# Patient Record
Sex: FEMALE | Race: ASIAN | NOT HISPANIC OR LATINO | URBAN - METROPOLITAN AREA
[De-identification: names, ages, dates, MRNs, and addresses within clinical notes are randomized per-mention and may not be internally consistent; named-entity substitution may affect disease eponyms.]

---

## 2018-06-13 ENCOUNTER — OUTPATIENT (OUTPATIENT)
Dept: OUTPATIENT SERVICES | Facility: HOSPITAL | Age: 28
LOS: 1 days | Discharge: HOME | End: 2018-06-13

## 2018-06-15 DIAGNOSIS — H90.3 SENSORINEURAL HEARING LOSS, BILATERAL: ICD-10-CM

## 2018-08-29 PROBLEM — Z00.00 ENCOUNTER FOR PREVENTIVE HEALTH EXAMINATION: Status: ACTIVE | Noted: 2018-08-29

## 2018-09-27 ENCOUNTER — OUTPATIENT (OUTPATIENT)
Dept: OUTPATIENT SERVICES | Facility: HOSPITAL | Age: 28
LOS: 1 days | Discharge: HOME | End: 2018-09-27

## 2018-09-27 DIAGNOSIS — Z00.01 ENCOUNTER FOR GENERAL ADULT MEDICAL EXAMINATION WITH ABNORMAL FINDINGS: ICD-10-CM

## 2018-09-27 DIAGNOSIS — Z11.4 ENCOUNTER FOR SCREENING FOR HUMAN IMMUNODEFICIENCY VIRUS [HIV]: ICD-10-CM

## 2018-09-28 ENCOUNTER — APPOINTMENT (OUTPATIENT)
Dept: OPHTHALMOLOGY | Facility: CLINIC | Age: 28
End: 2018-09-28
Payer: COMMERCIAL

## 2018-09-28 DIAGNOSIS — H47.323 DRUSEN OF OPTIC DISC, BILATERAL: ICD-10-CM

## 2018-09-28 PROCEDURE — 92015 DETERMINE REFRACTIVE STATE: CPT

## 2018-09-28 PROCEDURE — 92004 COMPRE OPH EXAM NEW PT 1/>: CPT

## 2018-09-29 ENCOUNTER — TRANSCRIPTION ENCOUNTER (OUTPATIENT)
Age: 28
End: 2018-09-29

## 2019-11-27 ENCOUNTER — INPATIENT (INPATIENT)
Facility: HOSPITAL | Age: 29
LOS: 4 days | Discharge: HOME | End: 2019-12-02
Attending: SURGERY | Admitting: SURGERY
Payer: COMMERCIAL

## 2019-11-27 ENCOUNTER — RESULT REVIEW (OUTPATIENT)
Age: 29
End: 2019-11-27

## 2019-11-27 VITALS
RESPIRATION RATE: 17 BRPM | DIASTOLIC BLOOD PRESSURE: 82 MMHG | HEIGHT: 60 IN | OXYGEN SATURATION: 100 % | WEIGHT: 117.95 LBS | SYSTOLIC BLOOD PRESSURE: 122 MMHG | HEART RATE: 83 BPM | TEMPERATURE: 98 F

## 2019-11-27 DIAGNOSIS — Z98.890 OTHER SPECIFIED POSTPROCEDURAL STATES: Chronic | ICD-10-CM

## 2019-11-27 LAB
ALBUMIN SERPL ELPH-MCNC: 4.9 G/DL — SIGNIFICANT CHANGE UP (ref 3.5–5.2)
ALP SERPL-CCNC: 75 U/L — SIGNIFICANT CHANGE UP (ref 30–115)
ALT FLD-CCNC: 7 U/L — SIGNIFICANT CHANGE UP (ref 0–41)
ANION GAP SERPL CALC-SCNC: 18 MMOL/L — HIGH (ref 7–14)
APPEARANCE UR: CLEAR — SIGNIFICANT CHANGE UP
AST SERPL-CCNC: 16 U/L — SIGNIFICANT CHANGE UP (ref 0–41)
BASOPHILS # BLD AUTO: 0.02 K/UL — SIGNIFICANT CHANGE UP (ref 0–0.2)
BASOPHILS NFR BLD AUTO: 0.2 % — SIGNIFICANT CHANGE UP (ref 0–1)
BILIRUB SERPL-MCNC: 0.6 MG/DL — SIGNIFICANT CHANGE UP (ref 0.2–1.2)
BILIRUB UR-MCNC: NEGATIVE — SIGNIFICANT CHANGE UP
BLD GP AB SCN SERPL QL: SIGNIFICANT CHANGE UP
BUN SERPL-MCNC: 13 MG/DL — SIGNIFICANT CHANGE UP (ref 10–20)
CALCIUM SERPL-MCNC: 9.5 MG/DL — SIGNIFICANT CHANGE UP (ref 8.5–10.1)
CHLORIDE SERPL-SCNC: 100 MMOL/L — SIGNIFICANT CHANGE UP (ref 98–110)
CO2 SERPL-SCNC: 20 MMOL/L — SIGNIFICANT CHANGE UP (ref 17–32)
COLOR SPEC: YELLOW — SIGNIFICANT CHANGE UP
CREAT SERPL-MCNC: 0.7 MG/DL — SIGNIFICANT CHANGE UP (ref 0.7–1.5)
DIFF PNL FLD: NEGATIVE — SIGNIFICANT CHANGE UP
EOSINOPHIL # BLD AUTO: 0.07 K/UL — SIGNIFICANT CHANGE UP (ref 0–0.7)
EOSINOPHIL NFR BLD AUTO: 0.6 % — SIGNIFICANT CHANGE UP (ref 0–8)
GLUCOSE SERPL-MCNC: 78 MG/DL — SIGNIFICANT CHANGE UP (ref 70–99)
GLUCOSE UR QL: NEGATIVE — SIGNIFICANT CHANGE UP
HCT VFR BLD CALC: 42.7 % — SIGNIFICANT CHANGE UP (ref 37–47)
HGB BLD-MCNC: 14.2 G/DL — SIGNIFICANT CHANGE UP (ref 12–16)
IMM GRANULOCYTES NFR BLD AUTO: 0.3 % — SIGNIFICANT CHANGE UP (ref 0.1–0.3)
KETONES UR-MCNC: ABNORMAL
LACTATE SERPL-SCNC: 0.6 MMOL/L — LOW (ref 0.7–2)
LEUKOCYTE ESTERASE UR-ACNC: NEGATIVE — SIGNIFICANT CHANGE UP
LIDOCAIN IGE QN: 37 U/L — SIGNIFICANT CHANGE UP (ref 7–60)
LYMPHOCYTES # BLD AUTO: 2.89 K/UL — SIGNIFICANT CHANGE UP (ref 1.2–3.4)
LYMPHOCYTES # BLD AUTO: 24.2 % — SIGNIFICANT CHANGE UP (ref 20.5–51.1)
MCHC RBC-ENTMCNC: 28.1 PG — SIGNIFICANT CHANGE UP (ref 27–31)
MCHC RBC-ENTMCNC: 33.3 G/DL — SIGNIFICANT CHANGE UP (ref 32–37)
MCV RBC AUTO: 84.6 FL — SIGNIFICANT CHANGE UP (ref 81–99)
MONOCYTES # BLD AUTO: 0.7 K/UL — HIGH (ref 0.1–0.6)
MONOCYTES NFR BLD AUTO: 5.9 % — SIGNIFICANT CHANGE UP (ref 1.7–9.3)
NEUTROPHILS # BLD AUTO: 8.24 K/UL — HIGH (ref 1.4–6.5)
NEUTROPHILS NFR BLD AUTO: 68.8 % — SIGNIFICANT CHANGE UP (ref 42.2–75.2)
NITRITE UR-MCNC: NEGATIVE — SIGNIFICANT CHANGE UP
NRBC # BLD: 0 /100 WBCS — SIGNIFICANT CHANGE UP (ref 0–0)
PH UR: 6 — SIGNIFICANT CHANGE UP (ref 5–8)
PLATELET # BLD AUTO: 259 K/UL — SIGNIFICANT CHANGE UP (ref 130–400)
POTASSIUM SERPL-MCNC: 4.2 MMOL/L — SIGNIFICANT CHANGE UP (ref 3.5–5)
POTASSIUM SERPL-SCNC: 4.2 MMOL/L — SIGNIFICANT CHANGE UP (ref 3.5–5)
PROT SERPL-MCNC: 7.8 G/DL — SIGNIFICANT CHANGE UP (ref 6–8)
PROT UR-MCNC: SIGNIFICANT CHANGE UP
RBC # BLD: 5.05 M/UL — SIGNIFICANT CHANGE UP (ref 4.2–5.4)
RBC # FLD: 12.3 % — SIGNIFICANT CHANGE UP (ref 11.5–14.5)
SODIUM SERPL-SCNC: 138 MMOL/L — SIGNIFICANT CHANGE UP (ref 135–146)
SP GR SPEC: 1.03 — HIGH (ref 1.01–1.02)
UROBILINOGEN FLD QL: SIGNIFICANT CHANGE UP
WBC # BLD: 11.95 K/UL — HIGH (ref 4.8–10.8)
WBC # FLD AUTO: 11.95 K/UL — HIGH (ref 4.8–10.8)

## 2019-11-27 PROCEDURE — 74177 CT ABD & PELVIS W/CONTRAST: CPT | Mod: 26

## 2019-11-27 PROCEDURE — 99285 EMERGENCY DEPT VISIT HI MDM: CPT

## 2019-11-27 PROCEDURE — 44970 LAPAROSCOPY APPENDECTOMY: CPT

## 2019-11-27 PROCEDURE — 64488 TAP BLOCK BI INJECTION: CPT | Mod: 47

## 2019-11-27 PROCEDURE — 88304 TISSUE EXAM BY PATHOLOGIST: CPT | Mod: 26

## 2019-11-27 PROCEDURE — 76830 TRANSVAGINAL US NON-OB: CPT | Mod: 26

## 2019-11-27 RX ORDER — ONDANSETRON 8 MG/1
4 TABLET, FILM COATED ORAL EVERY 6 HOURS
Refills: 0 | Status: DISCONTINUED | OUTPATIENT
Start: 2019-11-27 | End: 2019-11-27

## 2019-11-27 RX ORDER — SODIUM CHLORIDE 9 MG/ML
1000 INJECTION INTRAMUSCULAR; INTRAVENOUS; SUBCUTANEOUS ONCE
Refills: 0 | Status: COMPLETED | OUTPATIENT
Start: 2019-11-27 | End: 2019-11-27

## 2019-11-27 RX ORDER — CIPROFLOXACIN LACTATE 400MG/40ML
400 VIAL (ML) INTRAVENOUS EVERY 12 HOURS
Refills: 0 | Status: DISCONTINUED | OUTPATIENT
Start: 2019-11-27 | End: 2019-11-30

## 2019-11-27 RX ORDER — MORPHINE SULFATE 50 MG/1
4 CAPSULE, EXTENDED RELEASE ORAL
Refills: 0 | Status: DISCONTINUED | OUTPATIENT
Start: 2019-11-27 | End: 2019-11-28

## 2019-11-27 RX ORDER — HEPARIN SODIUM 5000 [USP'U]/ML
5000 INJECTION INTRAVENOUS; SUBCUTANEOUS EVERY 8 HOURS
Refills: 0 | Status: DISCONTINUED | OUTPATIENT
Start: 2019-11-27 | End: 2019-11-27

## 2019-11-27 RX ORDER — PANTOPRAZOLE SODIUM 20 MG/1
40 TABLET, DELAYED RELEASE ORAL DAILY
Refills: 0 | Status: DISCONTINUED | OUTPATIENT
Start: 2019-11-27 | End: 2019-12-02

## 2019-11-27 RX ORDER — ACETAMINOPHEN 500 MG
650 TABLET ORAL EVERY 6 HOURS
Refills: 0 | Status: DISCONTINUED | OUTPATIENT
Start: 2019-11-27 | End: 2019-11-29

## 2019-11-27 RX ORDER — ONDANSETRON 8 MG/1
4 TABLET, FILM COATED ORAL ONCE
Refills: 0 | Status: COMPLETED | OUTPATIENT
Start: 2019-11-27 | End: 2019-11-27

## 2019-11-27 RX ORDER — SODIUM CHLORIDE 9 MG/ML
1000 INJECTION, SOLUTION INTRAVENOUS
Refills: 0 | Status: DISCONTINUED | OUTPATIENT
Start: 2019-11-27 | End: 2019-11-27

## 2019-11-27 RX ORDER — MORPHINE SULFATE 50 MG/1
4 CAPSULE, EXTENDED RELEASE ORAL ONCE
Refills: 0 | Status: DISCONTINUED | OUTPATIENT
Start: 2019-11-27 | End: 2019-11-27

## 2019-11-27 RX ORDER — MEPERIDINE HYDROCHLORIDE 50 MG/ML
12.5 INJECTION INTRAMUSCULAR; INTRAVENOUS; SUBCUTANEOUS
Refills: 0 | Status: DISCONTINUED | OUTPATIENT
Start: 2019-11-27 | End: 2019-11-28

## 2019-11-27 RX ORDER — IOHEXOL 300 MG/ML
30 INJECTION, SOLUTION INTRAVENOUS ONCE
Refills: 0 | Status: COMPLETED | OUTPATIENT
Start: 2019-11-27 | End: 2019-11-27

## 2019-11-27 RX ORDER — ONDANSETRON 8 MG/1
4 TABLET, FILM COATED ORAL EVERY 6 HOURS
Refills: 0 | Status: DISCONTINUED | OUTPATIENT
Start: 2019-11-27 | End: 2019-12-02

## 2019-11-27 RX ORDER — HEPARIN SODIUM 5000 [USP'U]/ML
5000 INJECTION INTRAVENOUS; SUBCUTANEOUS EVERY 8 HOURS
Refills: 0 | Status: DISCONTINUED | OUTPATIENT
Start: 2019-11-27 | End: 2019-12-02

## 2019-11-27 RX ORDER — CEFOTETAN DISODIUM 1 G
1 VIAL (EA) INJECTION EVERY 12 HOURS
Refills: 0 | Status: DISCONTINUED | OUTPATIENT
Start: 2019-11-27 | End: 2019-11-27

## 2019-11-27 RX ORDER — SODIUM CHLORIDE 9 MG/ML
1000 INJECTION, SOLUTION INTRAVENOUS
Refills: 0 | Status: DISCONTINUED | OUTPATIENT
Start: 2019-11-27 | End: 2019-11-28

## 2019-11-27 RX ORDER — ONDANSETRON 8 MG/1
4 TABLET, FILM COATED ORAL ONCE
Refills: 0 | Status: DISCONTINUED | OUTPATIENT
Start: 2019-11-27 | End: 2019-11-28

## 2019-11-27 RX ORDER — METRONIDAZOLE 500 MG
500 TABLET ORAL EVERY 8 HOURS
Refills: 0 | Status: DISCONTINUED | OUTPATIENT
Start: 2019-11-27 | End: 2019-11-30

## 2019-11-27 RX ORDER — PANTOPRAZOLE SODIUM 20 MG/1
40 TABLET, DELAYED RELEASE ORAL DAILY
Refills: 0 | Status: DISCONTINUED | OUTPATIENT
Start: 2019-11-27 | End: 2019-11-27

## 2019-11-27 RX ORDER — ACETAMINOPHEN 500 MG
1000 TABLET ORAL ONCE
Refills: 0 | Status: DISCONTINUED | OUTPATIENT
Start: 2019-11-27 | End: 2019-11-28

## 2019-11-27 RX ORDER — MORPHINE SULFATE 50 MG/1
2 CAPSULE, EXTENDED RELEASE ORAL EVERY 4 HOURS
Refills: 0 | Status: DISCONTINUED | OUTPATIENT
Start: 2019-11-27 | End: 2019-11-27

## 2019-11-27 RX ORDER — CEFOTETAN DISODIUM 1 G
1 VIAL (EA) INJECTION ONCE
Refills: 0 | Status: COMPLETED | OUTPATIENT
Start: 2019-11-27 | End: 2019-11-27

## 2019-11-27 RX ORDER — MORPHINE SULFATE 50 MG/1
2 CAPSULE, EXTENDED RELEASE ORAL ONCE
Refills: 0 | Status: DISCONTINUED | OUTPATIENT
Start: 2019-11-27 | End: 2019-11-27

## 2019-11-27 RX ORDER — CHLORHEXIDINE GLUCONATE 213 G/1000ML
1 SOLUTION TOPICAL
Refills: 0 | Status: COMPLETED | OUTPATIENT
Start: 2019-11-27 | End: 2019-12-01

## 2019-11-27 RX ORDER — MORPHINE SULFATE 50 MG/1
2 CAPSULE, EXTENDED RELEASE ORAL
Refills: 0 | Status: DISCONTINUED | OUTPATIENT
Start: 2019-11-27 | End: 2019-11-28

## 2019-11-27 RX ORDER — ACETAMINOPHEN 500 MG
650 TABLET ORAL EVERY 6 HOURS
Refills: 0 | Status: DISCONTINUED | OUTPATIENT
Start: 2019-11-27 | End: 2019-11-27

## 2019-11-27 RX ADMIN — ONDANSETRON 4 MILLIGRAM(S): 8 TABLET, FILM COATED ORAL at 13:12

## 2019-11-27 RX ADMIN — IOHEXOL 30 MILLILITER(S): 300 INJECTION, SOLUTION INTRAVENOUS at 13:12

## 2019-11-27 RX ADMIN — SODIUM CHLORIDE 1000 MILLILITER(S): 9 INJECTION INTRAMUSCULAR; INTRAVENOUS; SUBCUTANEOUS at 13:12

## 2019-11-27 RX ADMIN — SODIUM CHLORIDE 1000 MILLILITER(S): 9 INJECTION INTRAMUSCULAR; INTRAVENOUS; SUBCUTANEOUS at 17:04

## 2019-11-27 RX ADMIN — Medication 100 GRAM(S): at 20:50

## 2019-11-27 RX ADMIN — MORPHINE SULFATE 2 MILLIGRAM(S): 50 CAPSULE, EXTENDED RELEASE ORAL at 17:03

## 2019-11-27 RX ADMIN — MORPHINE SULFATE 2 MILLIGRAM(S): 50 CAPSULE, EXTENDED RELEASE ORAL at 22:43

## 2019-11-27 RX ADMIN — ONDANSETRON 4 MILLIGRAM(S): 8 TABLET, FILM COATED ORAL at 16:07

## 2019-11-27 NOTE — CHART NOTE - NSCHARTNOTEFT_GEN_A_CORE
PACU ANESTHESIA ADMISSION NOTE      Procedure: Transversus abdominis plane (TAP) nerve block  Appendectomy, laparoscopic  Diagnostic laparoscopy    Post op diagnosis:  Cecal diverticulitis      ____  Intubated  TV:______       Rate: ______      FiO2: ______    __x__  Patent Airway    ____  Full return of protective reflexes    ____  Full recovery from anesthesia / back to baseline     Vitals:   T:     98      R:     12             BP:       95/59            Sat:   100%                P:  75      Mental Status:  __x__ Awake   _____ Alert   _____ Drowsy   _____ Sedated    Nausea/Vomiting:  __x__ NO  ______Yes,   See Post - Op Orders          Pain Scale (0-10):  _____    Treatment: ____ None    ___x_ See Post - Op/PCA Orders    Post - Operative Fluids:   ____ Oral   ___x_ See Post - Op Orders    Plan: Discharge:   ____Home       ___x__Floor     _____Critical Care    _____  Other:_________________    Comments:  Uneventful intraoperative course. No anesthesia issues or complications noted.  Patient stable upon arrival to PACU. Report given to RN. Discharge when criteria met.

## 2019-11-27 NOTE — BRIEF OPERATIVE NOTE - NSICDXBRIEFPROCEDURE_GEN_ALL_CORE_FT
PROCEDURES:  Transversus abdominis plane (TAP) nerve block 27-Nov-2019 21:53:14  Krunal Dalal  Appendectomy, laparoscopic 27-Nov-2019 21:53:08  Krunal Dalal  Diagnostic laparoscopy 27-Nov-2019 21:53:00  Krunal Dalal

## 2019-11-27 NOTE — ED PROVIDER NOTE - ATTENDING CONTRIBUTION TO CARE
29y f no pmh p/w RLQ pain x 3d. Intermitt x first 2d, sharp, non-radiating, however worsened in intensity yest evening. No f/c, cp/sob, nvd, flank pain, urinary sx, vag discharge, rash. No h/o ovarian cysts. PE: young f wdwn nad, ncat, neck supple, rrr nl s1s2 no mrg, ctab no wrr, abd soft nd +RLQ/R groin ttp rest non-tender no rgr, no cvat, ext nl.

## 2019-11-27 NOTE — H&P ADULT - NSHPLABSRESULTS_GEN_ALL_CORE
Labs:  CAPILLARY BLOOD GLUCOSE                   14.2   11.95 )-----------( 259      ( 2019 12:25 )             42.7       Auto Neutrophil %: 68.8 % (19 @ 12:25)  Auto Immature Granulocyte %: 0.3 % (19 @ 12:25)        138  |  100  |  13  ----------------------------<  78  4.2   |  20  |  0.7      Calcium, Total Serum: 9.5 mg/dL (19 @ 12:25)      LFTs:             7.8  | 0.6  | 16       ------------------[75      ( 2019 12:25 )  4.9  | x    | 7           Lipase:37     Amylase:x         Lactate, Blood: 0.6 mmol/L (19 @ 12:25)      Urinalysis Basic - ( 2019 14:05 )    Color: Yellow / Appearance: Clear / S.026 / pH: x  Gluc: x / Ketone: Moderate  / Bili: Negative / Urobili: <2 mg/dL   Blood: x / Protein: Trace / Nitrite: Negative   Leuk Esterase: Negative / RBC: x / WBC x   Sq Epi: x / Non Sq Epi: x / Bacteria: x      < from: CT Abdomen and Pelvis w/ Oral Cont and w/ IV Cont (19 @ 15:49) >    IMPRESSION:    Eccentric wall thickening and inflammation which appears centered on the   lateral wall of the cecum. The appendiceal base appears normal and   contrast-filled with the visualized portion of the mid to distal appendix   appearing upper limits of normal at 6 mm and slightly fluid-filled   although the tip is not definitively visualized. While tip appendicitis   is not entirely excluded, a primarily cecal infectious/inflammatory   etiology is overall favored over appendicitis given the normal   appendiceal base and given that the inflammationis centered on the   lateral wall of the cecum, possibly on the basis of cecal diverticulitis.   No free air or abscess. Follow-up colonoscopy is recommended once acute   symptoms resolve to ensure exclusion of an underlying lesion.    < end of copied text >

## 2019-11-27 NOTE — ED PROVIDER NOTE - PHYSICAL EXAMINATION
GENERAL:  well appearing, non-toxic female in no acute distress  SKIN: skin warm, pink and dry. MMM. no rash to abdomen or flank  PULM: CTAB. Normal respiratory effort. No respiratory distress. No wheezes, stridor, rales or rhonchi. No retractions  CV: RRR, no M/R/G.   ABD: Soft, non-distended. + RLQ abd tenderness. + mcburny point tenderness. No rebound or guarding. No CVA tenderness.  MSK: Moving all extremities. No edema, erythema, cyanosis. radial pulses equal and intact bilaterally.   NEURO: A+Ox3, no sensory/motor deficit  PSYCH: appropriate behavior, cooperative.

## 2019-11-27 NOTE — ED PROVIDER NOTE - CLINICAL SUMMARY MEDICAL DECISION MAKING FREE TEXT BOX
patient evaluated for rlq abd pain, required morphien for pain control. ct scan showed right sided diverticulitis vs possible appendicitis, surgtical consult obtained and iv abx started. patient admitted for ex lap.

## 2019-11-27 NOTE — ED ADULT NURSE NOTE - CHPI ED NUR SYMPTOMS NEG
no blood in stool/no burning urination/no fever/no hematuria/no abdominal distension/no vomiting/no chills/no dysuria/no diarrhea

## 2019-11-27 NOTE — H&P ADULT - ATTENDING COMMENTS
29F presents to ED with 2 days of RLQ pain, worsening over the last day. The pain was periumbilical then localized over the RLQ. The patient reports significant nausea, no vomiting, no diarrhea, afebrile. Reports constipation, took some stool softeners yesterday with some relief. The patient reports her LMP  was 2 weeks ago and normal. The patient denies chest pain, shortness of breath, urinary symptoms.     CT images reviews - appendicitis vs cecal diverticulitis  exam - rlq tenderness and guarding.     all options discusses with her.   Will do a diagnostic laparoscopy.  possible appendectomy

## 2019-11-27 NOTE — ED ADULT NURSE NOTE - ED STAT RN HANDOFF DETAILS
Endorsed to RN dulce maria in OR  Pt alert and orientedx3, VSS and afebrile. iv intact. education done with teach back. Safety maintained and hourly rounding performed. Will continue with plan of care. Endorsed to JENNIFER العراقي in OR & 4B JENNIFER Brunner   Pt alert and orientedx3, VSS and afebrile. iv intact. education done with teach back. Safety maintained and hourly rounding performed. Will continue with plan of care.

## 2019-11-27 NOTE — ED PROVIDER NOTE - NS ED ROS FT
Constitutional: no fever, chills   Cardiovascular: no chest pain, no sob, no syncope , no palpitations, no peripheral edema  Respiratory: no cough, no shortness of breath  Gastrointestinal: see HPI.  : no pelvic pain, no vaginal bleeding or discharge. no urinary sxs, no flank pain.  Integumentary: no rash or skin changes. no edema  Neurological: no headache, no dizziness, no visual changes, no UE/LE weakness or paresthesias. no change in mental status. no neck pain or stiffness.

## 2019-11-27 NOTE — H&P ADULT - HISTORY OF PRESENT ILLNESS
29F presents to ED with 2 days of RLQ pain, worsening over the last day. The pain was periumbilical then localized over the RLQ. The patient reports significant nausea, no vomiting, no diarrhea, afebrile. Reports constipation, took some stool softeners yesterday with some relief. The patient reports her LMP  was 2 weeks ago and normal. The patient denies chest pain, shortness of breath, urinary symptoms.

## 2019-11-27 NOTE — H&P ADULT - ASSESSMENT
29F with no pmhx presents to ED with RLQ pain x 2 days, +nausea, - vomiting, wbc 12.9. CT scan shows cecal inflammation with 6mm appendix.  -Admit  -NPO, IVF  -Cefotetan  -Plan for OR for diagnostic laparoscopy

## 2019-11-27 NOTE — ED PROVIDER NOTE - OBJECTIVE STATEMENT
30 yo female with no significant pmh presents to the ED c/o RLQ pain x several days but worse since last night. Pain is constant, sharp/cramping, nonradiating. Associated with nausea and decreased appetite. No prior abd surgery. Denies fever, chills, chest pain, sob, vomiting, diarrhea, urinary sxs, flank pain. LMP: 2 weeks ago. Patient admits to constipation for a few days, but after taking miralax and a BM this morning.

## 2019-11-28 LAB
ANION GAP SERPL CALC-SCNC: 10 MMOL/L — SIGNIFICANT CHANGE UP (ref 7–14)
ANION GAP SERPL CALC-SCNC: 15 MMOL/L — HIGH (ref 7–14)
BASOPHILS # BLD AUTO: 0 K/UL — SIGNIFICANT CHANGE UP (ref 0–0.2)
BASOPHILS NFR BLD AUTO: 0 % — SIGNIFICANT CHANGE UP (ref 0–1)
BUN SERPL-MCNC: 7 MG/DL — LOW (ref 10–20)
BUN SERPL-MCNC: 8 MG/DL — LOW (ref 10–20)
CALCIUM SERPL-MCNC: 8.7 MG/DL — SIGNIFICANT CHANGE UP (ref 8.5–10.1)
CALCIUM SERPL-MCNC: 8.9 MG/DL — SIGNIFICANT CHANGE UP (ref 8.5–10.1)
CHLORIDE SERPL-SCNC: 104 MMOL/L — SIGNIFICANT CHANGE UP (ref 98–110)
CHLORIDE SERPL-SCNC: 104 MMOL/L — SIGNIFICANT CHANGE UP (ref 98–110)
CO2 SERPL-SCNC: 21 MMOL/L — SIGNIFICANT CHANGE UP (ref 17–32)
CO2 SERPL-SCNC: 24 MMOL/L — SIGNIFICANT CHANGE UP (ref 17–32)
CREAT SERPL-MCNC: 0.7 MG/DL — SIGNIFICANT CHANGE UP (ref 0.7–1.5)
CREAT SERPL-MCNC: 0.8 MG/DL — SIGNIFICANT CHANGE UP (ref 0.7–1.5)
CULTURE RESULTS: SIGNIFICANT CHANGE UP
EOSINOPHIL # BLD AUTO: 0 K/UL — SIGNIFICANT CHANGE UP (ref 0–0.7)
EOSINOPHIL NFR BLD AUTO: 0 % — SIGNIFICANT CHANGE UP (ref 0–8)
GLUCOSE SERPL-MCNC: 121 MG/DL — HIGH (ref 70–99)
GLUCOSE SERPL-MCNC: 97 MG/DL — SIGNIFICANT CHANGE UP (ref 70–99)
HCT VFR BLD CALC: 33.4 % — LOW (ref 37–47)
HCT VFR BLD CALC: 36 % — LOW (ref 37–47)
HGB BLD-MCNC: 10.9 G/DL — LOW (ref 12–16)
HGB BLD-MCNC: 11.9 G/DL — LOW (ref 12–16)
IMM GRANULOCYTES NFR BLD AUTO: 0.3 % — SIGNIFICANT CHANGE UP (ref 0.1–0.3)
LYMPHOCYTES # BLD AUTO: 1.11 K/UL — LOW (ref 1.2–3.4)
LYMPHOCYTES # BLD AUTO: 10.8 % — LOW (ref 20.5–51.1)
MAGNESIUM SERPL-MCNC: 1.7 MG/DL — LOW (ref 1.8–2.4)
MAGNESIUM SERPL-MCNC: 1.8 MG/DL — SIGNIFICANT CHANGE UP (ref 1.8–2.4)
MCHC RBC-ENTMCNC: 27.7 PG — SIGNIFICANT CHANGE UP (ref 27–31)
MCHC RBC-ENTMCNC: 28.1 PG — SIGNIFICANT CHANGE UP (ref 27–31)
MCHC RBC-ENTMCNC: 32.6 G/DL — SIGNIFICANT CHANGE UP (ref 32–37)
MCHC RBC-ENTMCNC: 33.1 G/DL — SIGNIFICANT CHANGE UP (ref 32–37)
MCV RBC AUTO: 84.9 FL — SIGNIFICANT CHANGE UP (ref 81–99)
MCV RBC AUTO: 85 FL — SIGNIFICANT CHANGE UP (ref 81–99)
MONOCYTES # BLD AUTO: 0.14 K/UL — SIGNIFICANT CHANGE UP (ref 0.1–0.6)
MONOCYTES NFR BLD AUTO: 1.4 % — LOW (ref 1.7–9.3)
NEUTROPHILS # BLD AUTO: 9.04 K/UL — HIGH (ref 1.4–6.5)
NEUTROPHILS NFR BLD AUTO: 87.5 % — HIGH (ref 42.2–75.2)
NRBC # BLD: 0 /100 WBCS — SIGNIFICANT CHANGE UP (ref 0–0)
NRBC # BLD: 0 /100 WBCS — SIGNIFICANT CHANGE UP (ref 0–0)
PHOSPHATE SERPL-MCNC: 2.7 MG/DL — SIGNIFICANT CHANGE UP (ref 2.1–4.9)
PHOSPHATE SERPL-MCNC: 4.2 MG/DL — SIGNIFICANT CHANGE UP (ref 2.1–4.9)
PLATELET # BLD AUTO: 227 K/UL — SIGNIFICANT CHANGE UP (ref 130–400)
PLATELET # BLD AUTO: 235 K/UL — SIGNIFICANT CHANGE UP (ref 130–400)
POTASSIUM SERPL-MCNC: 3.6 MMOL/L — SIGNIFICANT CHANGE UP (ref 3.5–5)
POTASSIUM SERPL-MCNC: 4.6 MMOL/L — SIGNIFICANT CHANGE UP (ref 3.5–5)
POTASSIUM SERPL-SCNC: 3.6 MMOL/L — SIGNIFICANT CHANGE UP (ref 3.5–5)
POTASSIUM SERPL-SCNC: 4.6 MMOL/L — SIGNIFICANT CHANGE UP (ref 3.5–5)
RBC # BLD: 3.93 M/UL — LOW (ref 4.2–5.4)
RBC # BLD: 4.24 M/UL — SIGNIFICANT CHANGE UP (ref 4.2–5.4)
RBC # FLD: 12.2 % — SIGNIFICANT CHANGE UP (ref 11.5–14.5)
RBC # FLD: 12.3 % — SIGNIFICANT CHANGE UP (ref 11.5–14.5)
SODIUM SERPL-SCNC: 138 MMOL/L — SIGNIFICANT CHANGE UP (ref 135–146)
SODIUM SERPL-SCNC: 140 MMOL/L — SIGNIFICANT CHANGE UP (ref 135–146)
SPECIMEN SOURCE: SIGNIFICANT CHANGE UP
WBC # BLD: 10.32 K/UL — SIGNIFICANT CHANGE UP (ref 4.8–10.8)
WBC # BLD: 10.57 K/UL — SIGNIFICANT CHANGE UP (ref 4.8–10.8)
WBC # FLD AUTO: 10.32 K/UL — SIGNIFICANT CHANGE UP (ref 4.8–10.8)
WBC # FLD AUTO: 10.57 K/UL — SIGNIFICANT CHANGE UP (ref 4.8–10.8)

## 2019-11-28 PROCEDURE — 99232 SBSQ HOSP IP/OBS MODERATE 35: CPT

## 2019-11-28 RX ORDER — MORPHINE SULFATE 50 MG/1
2 CAPSULE, EXTENDED RELEASE ORAL EVERY 6 HOURS
Refills: 0 | Status: DISCONTINUED | OUTPATIENT
Start: 2019-11-28 | End: 2019-11-30

## 2019-11-28 RX ORDER — SODIUM CHLORIDE 9 MG/ML
1000 INJECTION, SOLUTION INTRAVENOUS
Refills: 0 | Status: DISCONTINUED | OUTPATIENT
Start: 2019-11-28 | End: 2019-11-28

## 2019-11-28 RX ORDER — KETOROLAC TROMETHAMINE 30 MG/ML
15 SYRINGE (ML) INJECTION ONCE
Refills: 0 | Status: DISCONTINUED | OUTPATIENT
Start: 2019-11-28 | End: 2019-11-28

## 2019-11-28 RX ORDER — SODIUM CHLORIDE 9 MG/ML
1000 INJECTION, SOLUTION INTRAVENOUS
Refills: 0 | Status: DISCONTINUED | OUTPATIENT
Start: 2019-11-28 | End: 2019-11-29

## 2019-11-28 RX ADMIN — Medication 650 MILLIGRAM(S): at 00:49

## 2019-11-28 RX ADMIN — Medication 200 MILLIGRAM(S): at 05:01

## 2019-11-28 RX ADMIN — SODIUM CHLORIDE 75 MILLILITER(S): 9 INJECTION, SOLUTION INTRAVENOUS at 12:12

## 2019-11-28 RX ADMIN — Medication 200 MILLIGRAM(S): at 17:51

## 2019-11-28 RX ADMIN — Medication 100 MILLIGRAM(S): at 21:52

## 2019-11-28 RX ADMIN — Medication 100 MILLIGRAM(S): at 13:47

## 2019-11-28 RX ADMIN — MORPHINE SULFATE 2 MILLIGRAM(S): 50 CAPSULE, EXTENDED RELEASE ORAL at 20:00

## 2019-11-28 RX ADMIN — Medication 15 MILLIGRAM(S): at 22:30

## 2019-11-28 RX ADMIN — MORPHINE SULFATE 2 MILLIGRAM(S): 50 CAPSULE, EXTENDED RELEASE ORAL at 13:57

## 2019-11-28 RX ADMIN — ONDANSETRON 4 MILLIGRAM(S): 8 TABLET, FILM COATED ORAL at 17:55

## 2019-11-28 RX ADMIN — SODIUM CHLORIDE 125 MILLILITER(S): 9 INJECTION, SOLUTION INTRAVENOUS at 03:30

## 2019-11-28 RX ADMIN — MORPHINE SULFATE 2 MILLIGRAM(S): 50 CAPSULE, EXTENDED RELEASE ORAL at 19:30

## 2019-11-28 RX ADMIN — Medication 100 MILLIGRAM(S): at 05:01

## 2019-11-28 RX ADMIN — PANTOPRAZOLE SODIUM 40 MILLIGRAM(S): 20 TABLET, DELAYED RELEASE ORAL at 13:47

## 2019-11-28 RX ADMIN — Medication 650 MILLIGRAM(S): at 17:51

## 2019-11-28 RX ADMIN — ONDANSETRON 4 MILLIGRAM(S): 8 TABLET, FILM COATED ORAL at 02:09

## 2019-11-28 RX ADMIN — MORPHINE SULFATE 2 MILLIGRAM(S): 50 CAPSULE, EXTENDED RELEASE ORAL at 05:24

## 2019-11-28 RX ADMIN — Medication 15 MILLIGRAM(S): at 21:53

## 2019-11-28 RX ADMIN — MORPHINE SULFATE 2 MILLIGRAM(S): 50 CAPSULE, EXTENDED RELEASE ORAL at 04:42

## 2019-11-28 RX ADMIN — Medication 650 MILLIGRAM(S): at 12:12

## 2019-11-28 RX ADMIN — Medication 650 MILLIGRAM(S): at 18:30

## 2019-11-28 RX ADMIN — Medication 650 MILLIGRAM(S): at 05:00

## 2019-11-28 NOTE — PROGRESS NOTE ADULT - SUBJECTIVE AND OBJECTIVE BOX
GENERAL SURGERY PROGRESS NOTE     SANCHO BASSETT  [address] [place] [address] day :1d  POD:  Procedure: Transversus abdominis plane (TAP) nerve block  Appendectomy, laparoscopic  Diagnostic laparoscopy    Surgical Attending: Keron Molina  Overnight events: POD from diagnostic lap and lap appendectomy  Doing well, pain controlled with medication.  Denies V/F/C.      T(F): 96.7 (19 @ 04:49), Max: 98.8 (19 @ 15:34)  HR: 80 (19 @ 04:49) (62 - 98)  BP: 104/55 (19 @ 04:49) (94/57 - 127/89)  ABP: --  ABP(mean): --  RR: 18 (19 @ 04:49) (12 - 19)  SpO2: 98% (19 @ 04:49) (97% - 100%)      19 @ 07:01  -  19 @ 04:53  --------------------------------------------------------  IN:    lactated ringers.: 150 mL    lactated ringers.: 250 mL  Total IN: 400 mL    OUT:  Total OUT: 0 mL    Total NET: 400 mL       GI proph:  pantoprazole  Injectable 40 milliGRAM(s) IV Push daily    AC/ proph: heparin  Injectable 5000 Unit(s) SubCutaneous every 8 hours    ABx: ciprofloxacin   IVPB 400 milliGRAM(s) IV Intermittent every 12 hours  metroNIDAZOLE  IVPB 500 milliGRAM(s) IV Intermittent every 8 hours      PHYSICAL EXAM:  GENERAL: NAD, well-appearing  CHEST/LUNG: Clear to auscultation bilaterally  HEART: Regular rate and rhythm  ABDOMEN: Soft, Nondistended; incisions CDI, ttp in RLQ   EXTREMITIES:  No clubbing, cyanosis, or edema      LABS  Labs:  CAPILLARY BLOOD GLUCOSE                   14.2   11.95 )-----------( 259      ( 2019 12:25 )             42.7       Auto Neutrophil %: 68.8 % (19 @ 12:25)  Auto Immature Granulocyte %: 0.3 % (19 @ 12:25)        138  |  100  |  13  ----------------------------<  78  4.2   |  20  |  0.7      Calcium, Total Serum: 9.5 mg/dL (19 @ 12:25)      LFTs:             7.8  | 0.6  | 16       ------------------[75      ( 2019 12:25 )  4.9  | x    | 7           Lipase:37     Amylase:x         Lactate, Blood: 0.6 mmol/L (19 @ 12:25)    Urinalysis Basic - ( 2019 14:05 )    Color: Yellow / Appearance: Clear / S.026 / pH: x  Gluc: x / Ketone: Moderate  / Bili: Negative / Urobili: <2 mg/dL   Blood: x / Protein: Trace / Nitrite: Negative   Leuk Esterase: Negative / RBC: x / WBC x   Sq Epi: x / Non Sq Epi: x / Bacteria: x

## 2019-11-28 NOTE — PROGRESS NOTE ADULT - ATTENDING COMMENTS
pt examined 11/28    pt admitted for right lower quadrant pain .   on Diagnostic laparoscopy cecal and ascending colon thickening.   normal appendix.   appendectomy done.     pt on floor  abdomen soft , minimal tenderness in the right lower quadrant.   noincisional pain.   ambulated  voided urine.     continue abx for colitis vs diverticulitis.  npo   serial abdominal exam

## 2019-11-28 NOTE — PROGRESS NOTE ADULT - ASSESSMENT
A/P:  SANCHO BASSETT is a 29yFemale HD2/POD 1 from diagnostic laparoscopy and laparoscopic appendectomy.     Plan  NPO   IVF   IV Abx   Ambulate

## 2019-11-28 NOTE — CHART NOTE - NSCHARTNOTEFT_GEN_A_CORE
Post Operative Note  Patient: SANCHO BASSETT 29y (1990) Female   MRN: [id number]  Location: [address]  Visit: 11-27-19 Inpatient  Date: 11-28-19 @ 04:48    Procedure: S/P diagnostic laparoscopy, laparoscopic appendectomy     Subjective: Mild nausea and pain overnight controlled with medication, voiding appropriately.     Objective:  Vitals: T(F): 97.7 (11-28-19 @ 01:00), Max: 98.8 (11-27-19 @ 15:34)  HR: 81 (11-28-19 @ 01:00)  BP: 104/51 (11-28-19 @ 01:00) (94/57 - 127/89)  RR: 18 (11-28-19 @ 01:00)  SpO2: 99% (11-28-19 @ 01:00)  Vent Settings:     In:   11-27-19 @ 07:01  -  11-28-19 @ 04:48  --------------------------------------------------------  IN: 400 mL  IV Fluids: lactated ringers. 1000 milliLiter(s) (125 mL/Hr) IV Continuous <Continuous>    Out:   11-27-19 @ 07:01  -  11-28-19 @ 04:48  --------------------------------------------------------  OUT: 0 ml    Voided Urine:   11-27-19 @ 07:01  -  11-28-19 @ 04:48  --------------------------------------------------------  OUT: 0 mL      Physical Examination:  General Appearance: NAD, alert and cooperative  HEENT: NCAT,  BROOKE, EOMI  Heart: S1 and S2. No murmurs. Rhythm is regular.   Lungs: Clear to auscultation BL without rales, rhonchi, wheezing or diminished breath sounds.  Abdomen:  Positive bowel sounds. Soft, nondistended, +zakiya-incisional tenderness, incisions CDI   Skin: Warm/dry, Normal color, texture and turgor with no lesions or eruptions. No jaundice.     Medications: [Standing]  acetaminophen   Tablet .. 650 milliGRAM(s) Oral every 6 hours  chlorhexidine 4% Liquid 1 Application(s) Topical <User Schedule>  ciprofloxacin   IVPB 400 milliGRAM(s) IV Intermittent every 12 hours  heparin  Injectable 5000 Unit(s) SubCutaneous every 8 hours  lactated ringers. 1000 milliLiter(s) IV Continuous <Continuous>  metroNIDAZOLE  IVPB 500 milliGRAM(s) IV Intermittent every 8 hours  morphine  - Injectable 2 milliGRAM(s) IV Push every 6 hours PRN  ondansetron Injectable 4 milliGRAM(s) IV Push every 6 hours PRN  pantoprazole  Injectable 40 milliGRAM(s) IV Push daily    Medications: [PRN]  acetaminophen   Tablet .. 650 milliGRAM(s) Oral every 6 hours  chlorhexidine 4% Liquid 1 Application(s) Topical <User Schedule>  ciprofloxacin   IVPB 400 milliGRAM(s) IV Intermittent every 12 hours  heparin  Injectable 5000 Unit(s) SubCutaneous every 8 hours  lactated ringers. 1000 milliLiter(s) IV Continuous <Continuous>  metroNIDAZOLE  IVPB 500 milliGRAM(s) IV Intermittent every 8 hours  morphine  - Injectable 2 milliGRAM(s) IV Push every 6 hours PRN  ondansetron Injectable 4 milliGRAM(s) IV Push every 6 hours PRN  pantoprazole  Injectable 40 milliGRAM(s) IV Push daily    Labs:                        14.2   11.95 )-----------( 259      ( 27 Nov 2019 12:25 )             42.7     11-27    138  |  100  |  13  ----------------------------<  78  4.2   |  20  |  0.7    Ca    9.5      27 Nov 2019 12:25    TPro  7.8  /  Alb  4.9  /  TBili  0.6  /  DBili  x   /  AST  16  /  ALT  7   /  AlkPhos  75  11-27        Imaging:  No post-op imaging studies    Assessment:  29yFemale patient S/P diagnostic laparoscopy and laparoscopic appendectomy.     Plan:   NPO   IVF  IV Abx       Date/Time: 11-28-19 @ 04:48

## 2019-11-29 LAB
ANION GAP SERPL CALC-SCNC: 12 MMOL/L — SIGNIFICANT CHANGE UP (ref 7–14)
ANION GAP SERPL CALC-SCNC: 15 MMOL/L — HIGH (ref 7–14)
BASOPHILS # BLD AUTO: 0.01 K/UL — SIGNIFICANT CHANGE UP (ref 0–0.2)
BASOPHILS NFR BLD AUTO: 0.1 % — SIGNIFICANT CHANGE UP (ref 0–1)
BUN SERPL-MCNC: 8 MG/DL — LOW (ref 10–20)
BUN SERPL-MCNC: 9 MG/DL — LOW (ref 10–20)
CALCIUM SERPL-MCNC: 8.6 MG/DL — SIGNIFICANT CHANGE UP (ref 8.5–10.1)
CALCIUM SERPL-MCNC: 9.2 MG/DL — SIGNIFICANT CHANGE UP (ref 8.5–10.1)
CHLORIDE SERPL-SCNC: 103 MMOL/L — SIGNIFICANT CHANGE UP (ref 98–110)
CHLORIDE SERPL-SCNC: 106 MMOL/L — SIGNIFICANT CHANGE UP (ref 98–110)
CO2 SERPL-SCNC: 23 MMOL/L — SIGNIFICANT CHANGE UP (ref 17–32)
CO2 SERPL-SCNC: 23 MMOL/L — SIGNIFICANT CHANGE UP (ref 17–32)
CREAT SERPL-MCNC: 0.7 MG/DL — SIGNIFICANT CHANGE UP (ref 0.7–1.5)
CREAT SERPL-MCNC: 0.9 MG/DL — SIGNIFICANT CHANGE UP (ref 0.7–1.5)
EOSINOPHIL # BLD AUTO: 0.13 K/UL — SIGNIFICANT CHANGE UP (ref 0–0.7)
EOSINOPHIL NFR BLD AUTO: 1.9 % — SIGNIFICANT CHANGE UP (ref 0–8)
GLUCOSE SERPL-MCNC: 103 MG/DL — HIGH (ref 70–99)
GLUCOSE SERPL-MCNC: 88 MG/DL — SIGNIFICANT CHANGE UP (ref 70–99)
HCT VFR BLD CALC: 33.3 % — LOW (ref 37–47)
HGB BLD-MCNC: 11.4 G/DL — LOW (ref 12–16)
IMM GRANULOCYTES NFR BLD AUTO: 0.4 % — HIGH (ref 0.1–0.3)
LYMPHOCYTES # BLD AUTO: 2.43 K/UL — SIGNIFICANT CHANGE UP (ref 1.2–3.4)
LYMPHOCYTES # BLD AUTO: 35.5 % — SIGNIFICANT CHANGE UP (ref 20.5–51.1)
MAGNESIUM SERPL-MCNC: 1.7 MG/DL — LOW (ref 1.8–2.4)
MCHC RBC-ENTMCNC: 28.6 PG — SIGNIFICANT CHANGE UP (ref 27–31)
MCHC RBC-ENTMCNC: 34.2 G/DL — SIGNIFICANT CHANGE UP (ref 32–37)
MCV RBC AUTO: 83.7 FL — SIGNIFICANT CHANGE UP (ref 81–99)
MONOCYTES # BLD AUTO: 0.66 K/UL — HIGH (ref 0.1–0.6)
MONOCYTES NFR BLD AUTO: 9.6 % — HIGH (ref 1.7–9.3)
NEUTROPHILS # BLD AUTO: 3.59 K/UL — SIGNIFICANT CHANGE UP (ref 1.4–6.5)
NEUTROPHILS NFR BLD AUTO: 52.5 % — SIGNIFICANT CHANGE UP (ref 42.2–75.2)
NRBC # BLD: 0 /100 WBCS — SIGNIFICANT CHANGE UP (ref 0–0)
PHOSPHATE SERPL-MCNC: 3.3 MG/DL — SIGNIFICANT CHANGE UP (ref 2.1–4.9)
PHOSPHATE SERPL-MCNC: 3.5 MG/DL — SIGNIFICANT CHANGE UP (ref 2.1–4.9)
PLATELET # BLD AUTO: 201 K/UL — SIGNIFICANT CHANGE UP (ref 130–400)
POTASSIUM SERPL-MCNC: 4 MMOL/L — SIGNIFICANT CHANGE UP (ref 3.5–5)
POTASSIUM SERPL-MCNC: 4.1 MMOL/L — SIGNIFICANT CHANGE UP (ref 3.5–5)
POTASSIUM SERPL-SCNC: 4 MMOL/L — SIGNIFICANT CHANGE UP (ref 3.5–5)
POTASSIUM SERPL-SCNC: 4.1 MMOL/L — SIGNIFICANT CHANGE UP (ref 3.5–5)
RBC # BLD: 3.98 M/UL — LOW (ref 4.2–5.4)
RBC # FLD: 12.6 % — SIGNIFICANT CHANGE UP (ref 11.5–14.5)
SODIUM SERPL-SCNC: 141 MMOL/L — SIGNIFICANT CHANGE UP (ref 135–146)
SODIUM SERPL-SCNC: 141 MMOL/L — SIGNIFICANT CHANGE UP (ref 135–146)
WBC # BLD: 6.85 K/UL — SIGNIFICANT CHANGE UP (ref 4.8–10.8)
WBC # FLD AUTO: 6.85 K/UL — SIGNIFICANT CHANGE UP (ref 4.8–10.8)

## 2019-11-29 PROCEDURE — 99222 1ST HOSP IP/OBS MODERATE 55: CPT

## 2019-11-29 RX ORDER — IBUPROFEN 200 MG
400 TABLET ORAL EVERY 6 HOURS
Refills: 0 | Status: DISCONTINUED | OUTPATIENT
Start: 2019-11-29 | End: 2019-12-02

## 2019-11-29 RX ORDER — KETOROLAC TROMETHAMINE 30 MG/ML
15 SYRINGE (ML) INJECTION EVERY 6 HOURS
Refills: 0 | Status: DISCONTINUED | OUTPATIENT
Start: 2019-11-29 | End: 2019-12-02

## 2019-11-29 RX ORDER — ACETAMINOPHEN 500 MG
500 TABLET ORAL EVERY 6 HOURS
Refills: 0 | Status: DISCONTINUED | OUTPATIENT
Start: 2019-11-29 | End: 2019-11-30

## 2019-11-29 RX ORDER — POTASSIUM PHOSPHATE, MONOBASIC POTASSIUM PHOSPHATE, DIBASIC 236; 224 MG/ML; MG/ML
15 INJECTION, SOLUTION INTRAVENOUS ONCE
Refills: 0 | Status: COMPLETED | OUTPATIENT
Start: 2019-11-29 | End: 2019-11-29

## 2019-11-29 RX ADMIN — Medication 500 MILLIGRAM(S): at 19:15

## 2019-11-29 RX ADMIN — Medication 500 MILLIGRAM(S): at 23:57

## 2019-11-29 RX ADMIN — SODIUM CHLORIDE 75 MILLILITER(S): 9 INJECTION, SOLUTION INTRAVENOUS at 03:32

## 2019-11-29 RX ADMIN — Medication 100 MILLIGRAM(S): at 14:15

## 2019-11-29 RX ADMIN — Medication 400 MILLIGRAM(S): at 12:50

## 2019-11-29 RX ADMIN — Medication 500 MILLIGRAM(S): at 12:50

## 2019-11-29 RX ADMIN — Medication 400 MILLIGRAM(S): at 19:04

## 2019-11-29 RX ADMIN — Medication 200 MILLIGRAM(S): at 19:04

## 2019-11-29 RX ADMIN — Medication 400 MILLIGRAM(S): at 19:15

## 2019-11-29 RX ADMIN — Medication 200 MILLIGRAM(S): at 06:26

## 2019-11-29 RX ADMIN — Medication 650 MILLIGRAM(S): at 06:26

## 2019-11-29 RX ADMIN — Medication 500 MILLIGRAM(S): at 19:04

## 2019-11-29 RX ADMIN — MORPHINE SULFATE 2 MILLIGRAM(S): 50 CAPSULE, EXTENDED RELEASE ORAL at 03:39

## 2019-11-29 RX ADMIN — Medication 650 MILLIGRAM(S): at 07:31

## 2019-11-29 RX ADMIN — POTASSIUM PHOSPHATE, MONOBASIC POTASSIUM PHOSPHATE, DIBASIC 63.75 MILLIMOLE(S): 236; 224 INJECTION, SOLUTION INTRAVENOUS at 03:30

## 2019-11-29 RX ADMIN — Medication 100 MILLIGRAM(S): at 23:56

## 2019-11-29 RX ADMIN — Medication 400 MILLIGRAM(S): at 12:46

## 2019-11-29 RX ADMIN — PANTOPRAZOLE SODIUM 40 MILLIGRAM(S): 20 TABLET, DELAYED RELEASE ORAL at 12:46

## 2019-11-29 RX ADMIN — Medication 500 MILLIGRAM(S): at 11:56

## 2019-11-29 RX ADMIN — MORPHINE SULFATE 2 MILLIGRAM(S): 50 CAPSULE, EXTENDED RELEASE ORAL at 04:10

## 2019-11-29 RX ADMIN — Medication 100 MILLIGRAM(S): at 05:15

## 2019-11-29 RX ADMIN — HEPARIN SODIUM 5000 UNIT(S): 5000 INJECTION INTRAVENOUS; SUBCUTANEOUS at 14:15

## 2019-11-29 NOTE — PROGRESS NOTE ADULT - ASSESSMENT
A/P:  29yFemale HD2/POD 1 from diagnostic laparoscopy and laparoscopic appendectomy.     Plan  Clears, continue advance as tolerated  IVF   IV Abx   Ambulate

## 2019-11-29 NOTE — PROGRESS NOTE ADULT - SUBJECTIVE AND OBJECTIVE BOX
GENERAL SURGERY PROGRESS NOTE     SANCHO BASSETT  29y  Female  Hospital day :2d  Procedure: Transversus abdominis plane (TAP) nerve block  Appendectomy, laparoscopic  Diagnostic laparoscopy    OVERNIGHT EVENTS: none    T(F): 98.6 (19 @ 19:49), Max: 98.6 (19 @ 19:49)  HR: 100 (19 @ 19:49) (59 - 100)  BP: 100/51 (19 @ 19:49) (90/54 - 104/55)  RR: 18 (19 @ 19:49) (18 - 18)  SpO2: 97% (19 @ 07:42) (97% - 98%)    DIET/FLUIDS: dextrose 5% + sodium chloride 0.45%. 1000 milliLiter(s) IV Continuous <Continuous>  potassium phosphate IVPB 15 milliMole(s) IV Intermittent once     GI proph:  pantoprazole  Injectable 40 milliGRAM(s) IV Push daily    AC/ proph: heparin  Injectable 5000 Unit(s) SubCutaneous every 8 hours    ABx: ciprofloxacin   IVPB 400 milliGRAM(s) IV Intermittent every 12 hours  metroNIDAZOLE  IVPB 500 milliGRAM(s) IV Intermittent every 8 hours      PHYSICAL EXAM:  GENERAL: NAD, well-appearing  CHEST/LUNG: Clear to auscultation bilaterally  HEART: Regular rate and rhythm  ABDOMEN: soft, RLQ tender  EXTREMITIES:  No clubbing, cyanosis, or edema      LABS  Labs:  CAPILLARY BLOOD GLUCOSE                              10.9   10.57 )-----------( 235      ( 2019 21:04 )             33.4       Auto Immature Granulocyte %: 0.3 % (19 @ 04:21)  Auto Neutrophil %: 87.5 % (19 @ 04:21)        138  |  104  |  8<L>  ----------------------------<  121<H>  3.6   |  24  |  0.8      Calcium, Total Serum: 8.9 mg/dL (19 @ 21:04)      LFTs:             7.8  | 0.6  | 16       ------------------[75      ( 2019 12:25 )  4.9  | x    | 7           Lipase:37     Amylase:x         Lactate, Blood: 0.6 mmol/L (19 @ 12:25)    Urinalysis Basic - ( 2019 14:05 )    Color: Yellow / Appearance: Clear / S.026 / pH: x  Gluc: x / Ketone: Moderate  / Bili: Negative / Urobili: <2 mg/dL   Blood: x / Protein: Trace / Nitrite: Negative   Leuk Esterase: Negative / RBC: x / WBC x   Sq Epi: x / Non Sq Epi: x / Bacteria: x        Culture - Body Fluid with Gram Stain (collected 2019 00:00)  Source: .Body Fluid None  Gram Stain (2019 13:24):    polymorphonuclear leukocytes per low power field    No organisms seen per oil power field    by cytocentrifuge    Culture - Urine (collected 2019 14:00)  Source: .Urine Clean Catch (Midstream)  Final Report (2019 20:16):    <10,000 CFU/mL Normal Urogenital Juliane

## 2019-11-29 NOTE — PROGRESS NOTE ADULT - ATTENDING COMMENTS
pt examined 11/29    pt admitted for right lower quadrant pain .   on Diagnostic laparoscopy cecal and ascending colon thickening.   normal appendix.   appendectomy done.     pt on floor  abdomen soft , minimal tenderness in the right lower quadrant.   no incisional pain.   ambulated  voided urine.   pain improving    continue abx for colitis vs diverticulitis.  tolerated clear liquid diet  may advance to soft diet - if pain improving.   serial abdominal exam

## 2019-11-29 NOTE — CONSULT NOTE ADULT - SUBJECTIVE AND OBJECTIVE BOX
Gastroenterology Consultation:    Patient is a 29y old  Female who presents with a chief complaint of Cecal Diverticulitis vs appendicitis (29 Nov 2019 02:29)  Admitted on: 11-27-19    HPI:  29F presents to ED with 2 days of RLQ pain, worsening over the last day. The pain was periumbilical then localized over the RLQ. The patient reports significant nausea, no vomiting, no diarrhea, afebrile. Reports constipation, took some stool softeners yesterday with some relief. The patient reports her LMP  was 2 weeks ago and normal. The patient denies chest pain, shortness of breath, urinary symptoms. (27 Nov 2019 19:24)    GI History:  The patient states that she started having zakiya-umbilical pain on Monday that she overlooked because it was mild pain. She woke up the next day and noticed pain at the RLQ, described as sharp and crampy, no radiation, associated with mild LLQ pain, continuous, getting progressively worse in intensity, rated as 10/10 Wednesday morning. The pain was associated with nausea but no vomiting, also associated with 2 days of constipation that resolved Tuesday overnight after she took Miralax. No hematochezia. No fever/chills. The pain got severe enough that she couldn't stand up on Wednesday, she went to the radiology department at [facility] and a bedside US was done and showed inflammation and the patient was told to go to the ED for evaluation.   In the ED a CT scan of the abdomen and pelvis was done and showed eccentric wall thickening and inflammation centered on the lateral wall of the cecum with a normal appendiceal base. The patient was admitted and underwent diagnostic laparoscopy with appendectomy on 11/27/2019 : Laparoscopy showed inflamed ascending colon, normal appendix with normal base. The patient was restarted on Clear liquid diet however the abdominal pain has persisted and GI is being consulted to rule out inflammatory disorder of the colon.   Of note : Last menstrual period was 11/12/2019. The patient has been experiencing mid-cycle pelvic pain for years, usually the pain lasts for 1 day however she noticed that the pain has been getting progressively worse with time and each episode has been getting longer ( 2 to 3 days). She usually has dysmenorrhea but no excessive bleeding. She is sexually active but uses protection, she denies any purulent vaginal discharge. She also denies urinary symptoms. She has no past medical history. No family history of IBD or GI cancer.       Prior records Reviewed (Y/N): Y   History obtained from person other than patient (Y/N): N    Prior EGD: No EGD performed in the past  Prior Colonoscopy: No colonoscopy performed in the past       PAST MEDICAL & SURGICAL HISTORY:  Hearing aid worn  History of tonsillectomy and adenoidectomy      FAMILY HISTORY:  Dementia  Chronic constipation ( Grand-mother)   No family history of GI disorder     Social History:  Tobacco: Denies smoking   Alcohol: Drinks alcohol socially   Drugs: Denies drug use     Home Medications:  No medication     MEDICATIONS  (STANDING):  acetaminophen   Tablet .. 500 milliGRAM(s) Oral every 6 hours  chlorhexidine 4% Liquid 1 Application(s) Topical <User Schedule>  ciprofloxacin   IVPB 400 milliGRAM(s) IV Intermittent every 12 hours  dextrose 5% + sodium chloride 0.45%. 1000 milliLiter(s) (75 mL/Hr) IV Continuous <Continuous>  heparin  Injectable 5000 Unit(s) SubCutaneous every 8 hours  ibuprofen  Tablet. 400 milliGRAM(s) Oral every 6 hours  metroNIDAZOLE  IVPB 500 milliGRAM(s) IV Intermittent every 8 hours  pantoprazole  Injectable 40 milliGRAM(s) IV Push daily    MEDICATIONS  (PRN):  ketorolac   Injectable 15 milliGRAM(s) IV Push every 6 hours PRN Moderate Pain (4 - 6)  morphine  - Injectable 2 milliGRAM(s) IV Push every 6 hours PRN Severe Pain (7 - 10)  ondansetron Injectable 4 milliGRAM(s) IV Push every 6 hours PRN Nausea      Allergies  Benadryl (Anaphylaxis)      Review of Systems:   Constitutional:  No Fever, No Chills  ENT/Mouth:  Has bilateral hearing aids   Cardiovascular:  No Chest Pain, No Palpitations  Respiratory:  No Cough, No Dyspnea  Gastrointestinal:  As described in HPI  Skin:  No Skin Lesions, No Jaundice   : No polyuria, no urgency, no hematuria   Gynecological : + Dysmenorrhea, no vaginal discharge       Physical Examination:  T(C): 36.1 (11-29-19 @ 08:00), Max: 37 (11-28-19 @ 19:49)  HR: 74 (11-29-19 @ 08:00) (74 - 100)  BP: 101/50 (11-29-19 @ 08:00) (92/54 - 102/52)  RR: 18 (11-29-19 @ 08:00) (18 - 18)  SpO2: --      11-27-19 @ 07:01  -  11-28-19 @ 07:00  --------------------------------------------------------  IN: 400 mL / OUT: 0 mL / NET: 400 mL    11-28-19 @ 07:01  -  11-29-19 @ 07:00  --------------------------------------------------------  IN: 2040 mL / OUT: 0 mL / NET: 2040 mL        Constitutional: No acute distress.  Eyes:. Conjunctivae are clear, Sclera is non-icteric.  Respiratory:  No signs of respiratory distress. Lung sounds are clear bilaterally.  Cardiovascular:  S1 S2, Regular rate and rhythm.  GI: Abdomen is soft, symmetric, and non-tender without distention. Mild pain at incision site. No hepatomegaly. Sotomayor negative. No guarding or rebound tenderness    Neuro: No Tremor, No involuntary movements  Skin: No rashes, No Jaundice.      Data: (reviewed by attending)                        10.9   10.57 )-----------( 235      ( 28 Nov 2019 21:04 )             33.4     Hgb Trend:  10.9  11-28-19 @ 21:04  11.9  11-28-19 @ 04:21  14.2  11-27-19 @ 12:25        11-28    138  |  104  |  8<L>  ----------------------------<  121<H>  3.6   |  24  |  0.8    Ca    8.9      28 Nov 2019 21:04  Phos  2.7     11-28  Mg     1.8     11-28    TPro  7.8  /  Alb  4.9  /  TBili  0.6  /  DBili  x   /  AST  16  /  ALT  7   /  AlkPhos  75  11-27    Liver panel trend:  TBili 0.6   /   AST 16   /   ALT 7   /   AlkP 75   /   Tptn 7.8   /   Alb 4.9    /   DBili --      11-27          Culture - Body Fluid with Gram Stain (collected 28 Nov 2019 00:00)  Source: .Body Fluid None  Gram Stain (28 Nov 2019 13:24):    polymorphonuclear leukocytes per low power field    No organisms seen per oil power field    by cytocentrifuge  Preliminary Report (29 Nov 2019 11:02):    No growth    Culture - Urine (collected 27 Nov 2019 14:00)  Source: .Urine Clean Catch (Midstream)  Final Report (28 Nov 2019 20:16):    <10,000 CFU/mL Normal Urogenital Juliane      Radiology:(reviewed by attending)    < from: CT Abdomen and Pelvis w/ Oral Cont and w/ IV Cont (11.27.19 @ 15:49) >  PROCEDURE DATE:  11/27/2019      INTERPRETATION:  CLINICAL HISTORY: Right lower quadrant pain.    TECHNIQUE: Contiguous axial CT images were obtained from the lower chest   to the pubic symphysis following administration of Optiray intravenous   contrast. Oral contrast was administered. Reformatted images in the   coronal and sagittal planes were acquired.    COMPARISON: None..    FINDINGS:    LOWER CHEST: Unremarkable.    HEPATOBILIARY: Unremarkable.    SPLEEN: Unremarkable.    PANCREAS: Unremarkable.    ADRENAL GLANDS: Unremarkable.    KIDNEYS: Symmetric pattern of renal enhancement. No hydronephrosis.    ABDOMINOPELVIC NODES: Mildly enlarged ileocolic lymph nodes measuring up   to 1 cm short axis    PELVIC ORGANS: 1.5 cm crenated left adnexal cysts.    PERITONEUM/MESENTERY/BOWEL: There is eccentric wall thickening and   inflammation which appears centered on the lateral wall of the cecum. A   few small cecal diverticulum are noted(series 5, image 258 and 291).   Questionable inflamed diverticulum on image 301. The appendiceal base is   normal and fills with contrast (image 313), however the mid to distal   appendix measures upper limits of normal at 6 mm and is fluid-filled with   the tip not definitively visualized. Small volume free pelvic fluid. No   abscess or free air identified. No bowel obstruction.    BONES/SOFT TISSUES: No acute osseous abnormality.    IMPRESSION:    Eccentric wall thickening and inflammation which appears centered on the   lateral wall of the cecum. The appendiceal base appears normal and   contrast-filled with the visualized portion of the mid to distal appendix   appearing upper limits of normal at 6 mm and slightly fluid-filled   although the tip is not definitively visualized. While tip appendicitis   is not entirely excluded, a primarily cecal infectious/inflammatory   etiology is overall favored over appendicitis given the normal   appendiceal base and given that the inflammationis centered on the   lateral wall of the cecum, possibly on the basis of cecal diverticulitis.   No free air or abscess. Follow-up colonoscopy is recommended once acute   symptoms resolve to ensure exclusion of an underlying lesion.    DANIELLA ALEJO M.D., RESIDENT RADIOLOGIST  This document has been electronically signed.  PRIYANKA ROSENBERG M.D., ATTENDING RADIOLOGIST  This document has been electronically signed. Nov 27 2019  5:21PM    < end of copied text >    < from: US Transvaginal (11.27.19 @ 17:48) >  EXAM:  US TRANSVAGINAL          PROCEDURE DATE:  11/27/2019      INTERPRETATION:  CLINICAL HISTORY: Right sided pelvic pain.    COMPARISON: CT dated 11/27/2019    PROCEDURE: Transabdominal and transvaginal ultrasound of the pelvis was   performed, including Doppler.    LMP: 11/11/2019    FINDINGS:    UTERUS: Anteverted measuring 8.0 x 3.6 x 3.4 cm, with normal echogenicity   and morphology. The endometrial echo complex measures 0.9 cm, which is   within normal limits.     RIGHT OVARY: measures 2.8 x 2.0 x 1.9 cm, and is unremarkable. Doppler   flow is demonstrated to the right ovary.     LEFT OVARY: measures 2.9 x 1.4 x 2.1 cm, and is unremarkable. Doppler   flow is demonstrated to the left ovary.     OTHER: Small free pelvic fluid.    IMPRESSION:    Normal size ovaries with vascular flow.  Small free pelvic fluid.    < end of copied text > Gastroenterology Consultation:    Patient is a 29y old  Female who presents with a chief complaint of Cecal Diverticulitis vs appendicitis (29 Nov 2019 02:29)  Admitted on: 11-27-19    HPI:  29F presents to ED with 2 days of RLQ pain, worsening over the last day. The pain was periumbilical then localized over the RLQ. The patient reports significant nausea, no vomiting, no diarrhea, afebrile. Reports constipation, took some stool softeners yesterday with some relief. The patient reports her LMP  was 2 weeks ago and normal. The patient denies chest pain, shortness of breath, urinary symptoms. (27 Nov 2019 19:24)    GI History:  The patient states that she started having zakiya-umbilical pain on Monday that she overlooked because it was mild pain. She woke up the next day and noticed pain at the RLQ, described as sharp and crampy, no radiation, associated with mild LLQ pain, continuous, getting progressively worse in intensity, rated as 10/10 Wednesday morning. The pain was associated with nausea but no vomiting, also associated with 2 days of constipation that resolved Tuesday overnight after she took Miralax. No hematochezia. No fever/chills. No alleviating factors and the patient avoided taking medication for her pain, she also states that the pain did not improve after having bowel movements and was not related to PO intake. She states that the pain was worse when she stood up and when she completely relaxed her back in bed, she had to maintain a tense position to keep the pain under control. The pain got severe enough that she couldn't stand up on Wednesday, she went to the radiology department at [facility] and a bedside US was done and showed inflammation and the patient was told to go to the ED for evaluation.   In the ED a CT scan of the abdomen and pelvis was done and showed eccentric wall thickening and inflammation centered on the lateral wall of the cecum with a normal appendiceal base. The patient was admitted and underwent diagnostic laparoscopy with appendectomy on 11/27/2019 : Laparoscopy showed inflamed ascending colon, normal appendix with normal base. The patient was restarted on Clear liquid diet however the abdominal pain has persisted and GI is being consulted to rule out inflammatory disorder of the colon.   Of note : Last menstrual period was 11/12/2019. The patient has been experiencing mid-cycle pelvic pain for years, usually the pain lasts for 1 day however she noticed that the pain has been getting progressively worse with time and each episode has been getting longer ( 2 to 3 days). She usually has dysmenorrhea but no excessive bleeding. She is sexually active but uses protection, she denies any purulent vaginal discharge. She also denies urinary symptoms. She has no past medical history. No family history of IBD or GI cancer.       Prior records Reviewed (Y/N): Y   History obtained from person other than patient (Y/N): N    Prior EGD: No EGD performed in the past  Prior Colonoscopy: No colonoscopy performed in the past       PAST MEDICAL & SURGICAL HISTORY:  Hearing aid worn  History of tonsillectomy and adenoidectomy      FAMILY HISTORY:  Dementia  Chronic constipation ( Grand-mother)   No family history of GI disorder     Social History:  Tobacco: Denies smoking   Alcohol: Drinks alcohol socially   Drugs: Denies drug use     Home Medications:  No medication     MEDICATIONS  (STANDING):  acetaminophen   Tablet .. 500 milliGRAM(s) Oral every 6 hours  chlorhexidine 4% Liquid 1 Application(s) Topical <User Schedule>  ciprofloxacin   IVPB 400 milliGRAM(s) IV Intermittent every 12 hours  dextrose 5% + sodium chloride 0.45%. 1000 milliLiter(s) (75 mL/Hr) IV Continuous <Continuous>  heparin  Injectable 5000 Unit(s) SubCutaneous every 8 hours  ibuprofen  Tablet. 400 milliGRAM(s) Oral every 6 hours  metroNIDAZOLE  IVPB 500 milliGRAM(s) IV Intermittent every 8 hours  pantoprazole  Injectable 40 milliGRAM(s) IV Push daily    MEDICATIONS  (PRN):  ketorolac   Injectable 15 milliGRAM(s) IV Push every 6 hours PRN Moderate Pain (4 - 6)  morphine  - Injectable 2 milliGRAM(s) IV Push every 6 hours PRN Severe Pain (7 - 10)  ondansetron Injectable 4 milliGRAM(s) IV Push every 6 hours PRN Nausea      Allergies  Benadryl (Anaphylaxis)      Review of Systems:   Constitutional:  No Fever, No Chills  ENT/Mouth:  Has bilateral hearing aids   Cardiovascular:  No Chest Pain, No Palpitations  Respiratory:  No Cough, No Dyspnea  Gastrointestinal:  As described in HPI  Skin:  No Skin Lesions, No Jaundice   : No polyuria, no urgency, no hematuria   Gynecological : + Dysmenorrhea, no vaginal discharge       Physical Examination:  T(C): 36.1 (11-29-19 @ 08:00), Max: 37 (11-28-19 @ 19:49)  HR: 74 (11-29-19 @ 08:00) (74 - 100)  BP: 101/50 (11-29-19 @ 08:00) (92/54 - 102/52)  RR: 18 (11-29-19 @ 08:00) (18 - 18)  SpO2: --      11-27-19 @ 07:01  -  11-28-19 @ 07:00  --------------------------------------------------------  IN: 400 mL / OUT: 0 mL / NET: 400 mL    11-28-19 @ 07:01  -  11-29-19 @ 07:00  --------------------------------------------------------  IN: 2040 mL / OUT: 0 mL / NET: 2040 mL        Constitutional: No acute distress.  Eyes:. Conjunctivae are clear, Sclera is non-icteric.  Respiratory:  No signs of respiratory distress. Lung sounds are clear bilaterally.  Cardiovascular:  S1 S2, Regular rate and rhythm.  GI: Abdomen is soft, symmetric, and non-tender without distention. Mild pain at incision site. No hepatomegaly. Sotomayor negative. No guarding or rebound tenderness    Neuro: No Tremor, No involuntary movements  Skin: No rashes, No Jaundice.      Data: (reviewed by attending)                        10.9   10.57 )-----------( 235      ( 28 Nov 2019 21:04 )             33.4     Hgb Trend:  10.9  11-28-19 @ 21:04  11.9  11-28-19 @ 04:21  14.2  11-27-19 @ 12:25        11-28    138  |  104  |  8<L>  ----------------------------<  121<H>  3.6   |  24  |  0.8    Ca    8.9      28 Nov 2019 21:04  Phos  2.7     11-28  Mg     1.8     11-28    TPro  7.8  /  Alb  4.9  /  TBili  0.6  /  DBili  x   /  AST  16  /  ALT  7   /  AlkPhos  75  11-27    Liver panel trend:  TBili 0.6   /   AST 16   /   ALT 7   /   AlkP 75   /   Tptn 7.8   /   Alb 4.9    /   DBili --      11-27          Culture - Body Fluid with Gram Stain (collected 28 Nov 2019 00:00)  Source: .Body Fluid None  Gram Stain (28 Nov 2019 13:24):    polymorphonuclear leukocytes per low power field    No organisms seen per oil power field    by cytocentrifuge  Preliminary Report (29 Nov 2019 11:02):    No growth    Culture - Urine (collected 27 Nov 2019 14:00)  Source: .Urine Clean Catch (Midstream)  Final Report (28 Nov 2019 20:16):    <10,000 CFU/mL Normal Urogenital Juliane      Radiology:(reviewed by attending)    < from: CT Abdomen and Pelvis w/ Oral Cont and w/ IV Cont (11.27.19 @ 15:49) >  PROCEDURE DATE:  11/27/2019      INTERPRETATION:  CLINICAL HISTORY: Right lower quadrant pain.    TECHNIQUE: Contiguous axial CT images were obtained from the lower chest   to the pubic symphysis following administration of Optiray intravenous   contrast. Oral contrast was administered. Reformatted images in the   coronal and sagittal planes were acquired.    COMPARISON: None..    FINDINGS:    LOWER CHEST: Unremarkable.    HEPATOBILIARY: Unremarkable.    SPLEEN: Unremarkable.    PANCREAS: Unremarkable.    ADRENAL GLANDS: Unremarkable.    KIDNEYS: Symmetric pattern of renal enhancement. No hydronephrosis.    ABDOMINOPELVIC NODES: Mildly enlarged ileocolic lymph nodes measuring up   to 1 cm short axis    PELVIC ORGANS: 1.5 cm crenated left adnexal cysts.    PERITONEUM/MESENTERY/BOWEL: There is eccentric wall thickening and   inflammation which appears centered on the lateral wall of the cecum. A   few small cecal diverticulum are noted(series 5, image 258 and 291).   Questionable inflamed diverticulum on image 301. The appendiceal base is   normal and fills with contrast (image 313), however the mid to distal   appendix measures upper limits of normal at 6 mm and is fluid-filled with   the tip not definitively visualized. Small volume free pelvic fluid. No   abscess or free air identified. No bowel obstruction.    BONES/SOFT TISSUES: No acute osseous abnormality.    IMPRESSION:    Eccentric wall thickening and inflammation which appears centered on the   lateral wall of the cecum. The appendiceal base appears normal and   contrast-filled with the visualized portion of the mid to distal appendix   appearing upper limits of normal at 6 mm and slightly fluid-filled   although the tip is not definitively visualized. While tip appendicitis   is not entirely excluded, a primarily cecal infectious/inflammatory   etiology is overall favored over appendicitis given the normal   appendiceal base and given that the inflammationis centered on the   lateral wall of the cecum, possibly on the basis of cecal diverticulitis.   No free air or abscess. Follow-up colonoscopy is recommended once acute   symptoms resolve to ensure exclusion of an underlying lesion.    DANIELLA ALEJO M.D., RESIDENT RADIOLOGIST  This document has been electronically signed.  PRIYANKA ROSENBERG M.D., ATTENDING RADIOLOGIST  This document has been electronically signed. Nov 27 2019  5:21PM    < end of copied text >    < from: US Transvaginal (11.27.19 @ 17:48) >  EXAM:  US TRANSVAGINAL          PROCEDURE DATE:  11/27/2019      INTERPRETATION:  CLINICAL HISTORY: Right sided pelvic pain.    COMPARISON: CT dated 11/27/2019    PROCEDURE: Transabdominal and transvaginal ultrasound of the pelvis was   performed, including Doppler.    LMP: 11/11/2019    FINDINGS:    UTERUS: Anteverted measuring 8.0 x 3.6 x 3.4 cm, with normal echogenicity   and morphology. The endometrial echo complex measures 0.9 cm, which is   within normal limits.     RIGHT OVARY: measures 2.8 x 2.0 x 1.9 cm, and is unremarkable. Doppler   flow is demonstrated to the right ovary.     LEFT OVARY: measures 2.9 x 1.4 x 2.1 cm, and is unremarkable. Doppler   flow is demonstrated to the left ovary.     OTHER: Small free pelvic fluid.    IMPRESSION:    Normal size ovaries with vascular flow.  Small free pelvic fluid.    < end of copied text >

## 2019-11-29 NOTE — CONSULT NOTE ADULT - ASSESSMENT
29 year old female patient with no past medical history presenting for acute RLQ pain, underwent diagnostic laparoscopy that showed inflammation of the ascending colon and normal appendix     # Acute RLQ pain :  - No personal or family history of IBD or cancer   - CT scan / diagnostic laparoscopy done and showed inflammation of the ascending colon with normal appendix   - WBC is borderline high, no sepsis on admission otherwise     Recommendations :         This note is incomplete, attending attestation to follow 29 year old female patient with no past medical history presenting for acute RLQ pain, underwent diagnostic laparoscopy that showed inflammation of the ascending colon and normal appendix     # Acute RLQ pain :  - No personal or family history of IBD or cancer   - CT scan / diagnostic laparoscopy done and showed inflammation of the ascending colon with normal appendix   - WBC is borderline high, no sepsis on admission otherwise  - Picture is more likely consistent with IBD     Recommendations :   - Stool Lactoferrin and Calprotectin would not be of utility in this case as they are expected to be high post-op   - Colonoscopy as outpatient 2 months from now     This note is incomplete, attending attestation to follow 29 year old female patient with no past medical history presenting for acute RLQ pain, underwent diagnostic laparoscopy that showed inflammation of the ascending colon and normal appendix     # Acute RLQ pain :  - No personal or family history of IBD or cancer   - CT scan / diagnostic laparoscopy done and showed inflammation of the ascending colon with normal appendix   - WBC is borderline high, no sepsis on admission otherwise  - Picture is more likely consistent with IBD     Recommendations :   - Stool Lactoferrin and Calprotectin would not be of utility in this case as they are expected to be high post-op   - Colonoscopy as outpatient 1 month from now   - QuantiFeron and PPD (colonic tuberculosis unlikely, however if patient's symptoms do not improve prior to scheduled colonoscopy might consider low dose steroids for presumed IBD however Tb needs to be ruled out first)     This note is incomplete, 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 TB    Recommendations :   - ESR, CRP, Stool Lactoferrin and Calprotectin would not be of utility in this case as they are expected to be high given inflammation seen on x lap  - Colonoscopy as outpatient 1 month from now   - check QuantiFeron and place PPD (colonic tuberculosis unlikely, however if patient's symptoms do not improve prior to scheduled colonoscopy might consider low dose prednisone vs. budesonide for presumed IBD and Tb needs to be ruled out first)

## 2019-11-30 LAB
APPEARANCE UR: CLEAR — SIGNIFICANT CHANGE UP
BILIRUB UR-MCNC: NEGATIVE — SIGNIFICANT CHANGE UP
C DIFF BY PCR RESULT: NEGATIVE — SIGNIFICANT CHANGE UP
C DIFF TOX GENS STL QL NAA+PROBE: SIGNIFICANT CHANGE UP
COLOR SPEC: SIGNIFICANT CHANGE UP
DIFF PNL FLD: NEGATIVE — SIGNIFICANT CHANGE UP
GLUCOSE UR QL: NEGATIVE — SIGNIFICANT CHANGE UP
KETONES UR-MCNC: SIGNIFICANT CHANGE UP
LEUKOCYTE ESTERASE UR-ACNC: NEGATIVE — SIGNIFICANT CHANGE UP
MAGNESIUM SERPL-MCNC: 2.3 MG/DL — SIGNIFICANT CHANGE UP (ref 1.8–2.4)
NITRITE UR-MCNC: NEGATIVE — SIGNIFICANT CHANGE UP
PH UR: 7 — SIGNIFICANT CHANGE UP (ref 5–8)
PROT UR-MCNC: NEGATIVE — SIGNIFICANT CHANGE UP
SP GR SPEC: 1.01 — SIGNIFICANT CHANGE UP (ref 1.01–1.02)
UROBILINOGEN FLD QL: SIGNIFICANT CHANGE UP

## 2019-11-30 PROCEDURE — 99232 SBSQ HOSP IP/OBS MODERATE 35: CPT | Mod: 24

## 2019-11-30 RX ORDER — ACETAMINOPHEN 500 MG
500 TABLET ORAL EVERY 6 HOURS
Refills: 0 | Status: DISCONTINUED | OUTPATIENT
Start: 2019-11-30 | End: 2019-12-02

## 2019-11-30 RX ORDER — PIPERACILLIN AND TAZOBACTAM 4; .5 G/20ML; G/20ML
3.38 INJECTION, POWDER, LYOPHILIZED, FOR SOLUTION INTRAVENOUS EVERY 8 HOURS
Refills: 0 | Status: DISCONTINUED | OUTPATIENT
Start: 2019-11-30 | End: 2019-12-02

## 2019-11-30 RX ORDER — MAGNESIUM SULFATE 500 MG/ML
2 VIAL (ML) INJECTION ONCE
Refills: 0 | Status: COMPLETED | OUTPATIENT
Start: 2019-11-30 | End: 2019-11-30

## 2019-11-30 RX ORDER — ACETAMINOPHEN 500 MG
1000 TABLET ORAL ONCE
Refills: 0 | Status: COMPLETED | OUTPATIENT
Start: 2019-11-30 | End: 2019-11-30

## 2019-11-30 RX ORDER — TRAMADOL HYDROCHLORIDE 50 MG/1
25 TABLET ORAL EVERY 6 HOURS
Refills: 0 | Status: DISCONTINUED | OUTPATIENT
Start: 2019-11-30 | End: 2019-12-02

## 2019-11-30 RX ORDER — PIPERACILLIN AND TAZOBACTAM 4; .5 G/20ML; G/20ML
3.38 INJECTION, POWDER, LYOPHILIZED, FOR SOLUTION INTRAVENOUS ONCE
Refills: 0 | Status: COMPLETED | OUTPATIENT
Start: 2019-11-30 | End: 2019-11-30

## 2019-11-30 RX ADMIN — Medication 200 MILLIGRAM(S): at 05:37

## 2019-11-30 RX ADMIN — Medication 100 MILLIGRAM(S): at 05:37

## 2019-11-30 RX ADMIN — TRAMADOL HYDROCHLORIDE 25 MILLIGRAM(S): 50 TABLET ORAL at 12:25

## 2019-11-30 RX ADMIN — Medication 1000 MILLIGRAM(S): at 09:24

## 2019-11-30 RX ADMIN — PANTOPRAZOLE SODIUM 40 MILLIGRAM(S): 20 TABLET, DELAYED RELEASE ORAL at 12:25

## 2019-11-30 RX ADMIN — Medication 500 MILLIGRAM(S): at 05:37

## 2019-11-30 RX ADMIN — Medication 500 MILLIGRAM(S): at 05:38

## 2019-11-30 RX ADMIN — ONDANSETRON 4 MILLIGRAM(S): 8 TABLET, FILM COATED ORAL at 00:05

## 2019-11-30 RX ADMIN — TRAMADOL HYDROCHLORIDE 25 MILLIGRAM(S): 50 TABLET ORAL at 19:34

## 2019-11-30 RX ADMIN — PIPERACILLIN AND TAZOBACTAM 200 GRAM(S): 4; .5 INJECTION, POWDER, LYOPHILIZED, FOR SOLUTION INTRAVENOUS at 13:15

## 2019-11-30 RX ADMIN — Medication 500 MILLIGRAM(S): at 17:10

## 2019-11-30 RX ADMIN — PIPERACILLIN AND TAZOBACTAM 25 GRAM(S): 4; .5 INJECTION, POWDER, LYOPHILIZED, FOR SOLUTION INTRAVENOUS at 21:44

## 2019-11-30 RX ADMIN — Medication 400 MILLIGRAM(S): at 09:23

## 2019-11-30 RX ADMIN — TRAMADOL HYDROCHLORIDE 25 MILLIGRAM(S): 50 TABLET ORAL at 19:04

## 2019-11-30 RX ADMIN — TRAMADOL HYDROCHLORIDE 25 MILLIGRAM(S): 50 TABLET ORAL at 13:20

## 2019-11-30 RX ADMIN — Medication 50 GRAM(S): at 05:37

## 2019-11-30 RX ADMIN — Medication 500 MILLIGRAM(S): at 16:57

## 2019-11-30 NOTE — PROGRESS NOTE ADULT - SUBJECTIVE AND OBJECTIVE BOX
GENERAL SURGERY PROGRESS NOTE     SANCHO BASSETT  29y  Female  Hospital day: 4   POD: 3   Procedure: Transversus abdominis plane (TAP) nerve block  Appendectomy, laparoscopic  Diagnostic laparoscopy    OVERNIGHT EVENTS: No acute events overnight, patient endorsing pain at the incision site. She is passing flatus and having bowel movements. She denies any nausea or episodes of emesis.     T(F): 98.3 (11-29-19 @ 23:55), Max: 98.5 (11-29-19 @ 15:43)  HR: 81 (11-29-19 @ 23:55) (74 - 81)  BP: 107/56 (11-29-19 @ 23:55) (101/50 - 109/59)  RR: 14 (11-29-19 @ 23:55) (14 - 18)    DIET/FLUIDS: magnesium sulfate  IVPB 2 Gram(s) IV Intermittent once    GI proph:  pantoprazole  Injectable 40 milliGRAM(s) IV Push daily    AC/ proph: heparin  Injectable 5000 Unit(s) SubCutaneous every 8 hours    ABx: ciprofloxacin   IVPB 400 milliGRAM(s) IV Intermittent every 12 hours  metroNIDAZOLE  IVPB 500 milliGRAM(s) IV Intermittent every 8 hours    PHYSICAL EXAM:  GENERAL: NAD  CHEST/LUNG: Clear to auscultation bilaterally  HEART: Regular rate and rhythm  ABDOMEN: Soft, tender in RLQ   EXTREMITIES:  No clubbing, cyanosis, or edema    LABS               11.4   6.85  )-----------( 201      ( 29 Nov 2019 21:34 )             33.3       Auto Neutrophil %: 52.5 % (11-29-19 @ 21:34)  Auto Immature Granulocyte %: 0.4 % (11-29-19 @ 21:34)    11-29    141  |  106  |  9<L>  ----------------------------<  103<H>  4.1   |  23  |  0.9      Calcium, Total Serum: 8.6 mg/dL (11-29-19 @ 21:34)    Lactate, Blood: 0.6 mmol/L (11-27-19 @ 12:25)    Culture - Body Fluid with Gram Stain (collected 28 Nov 2019 00:00)  Source: .Body Fluid None  Gram Stain (28 Nov 2019 13:24):    polymorphonuclear leukocytes per low power field    No organisms seen per oil power field    by cytocentrifuge  Preliminary Report (29 Nov 2019 11:02):    No growth    Culture - Urine (collected 27 Nov 2019 14:00)  Source: .Urine Clean Catch (Midstream)  Final Report (28 Nov 2019 20:16):    <10,000 CFU/mL Normal Urogenital Juliane      RADIOLOGY & ADDITIONAL TESTS:  *No new imaging

## 2019-11-30 NOTE — PROGRESS NOTE ADULT - ASSESSMENT
Assessment:  29yFemale HD2/POD 1 from diagnostic laparoscopy and laparoscopic appendectomy.     Plan:  -F/U outpatient with GI for colonoscopy (Dr. Baez)   -Adequate pain control   -Regular diet   -Continue cipro/flagyl

## 2019-12-01 LAB
ANION GAP SERPL CALC-SCNC: 10 MMOL/L — SIGNIFICANT CHANGE UP (ref 7–14)
ANION GAP SERPL CALC-SCNC: 12 MMOL/L — SIGNIFICANT CHANGE UP (ref 7–14)
BASOPHILS # BLD AUTO: 0.02 K/UL — SIGNIFICANT CHANGE UP (ref 0–0.2)
BASOPHILS NFR BLD AUTO: 0.3 % — SIGNIFICANT CHANGE UP (ref 0–1)
BUN SERPL-MCNC: 13 MG/DL — SIGNIFICANT CHANGE UP (ref 10–20)
BUN SERPL-MCNC: 14 MG/DL — SIGNIFICANT CHANGE UP (ref 10–20)
CALCIUM SERPL-MCNC: 8.8 MG/DL — SIGNIFICANT CHANGE UP (ref 8.5–10.1)
CALCIUM SERPL-MCNC: 9.2 MG/DL — SIGNIFICANT CHANGE UP (ref 8.5–10.1)
CHLORIDE SERPL-SCNC: 103 MMOL/L — SIGNIFICANT CHANGE UP (ref 98–110)
CHLORIDE SERPL-SCNC: 107 MMOL/L — SIGNIFICANT CHANGE UP (ref 98–110)
CO2 SERPL-SCNC: 22 MMOL/L — SIGNIFICANT CHANGE UP (ref 17–32)
CO2 SERPL-SCNC: 25 MMOL/L — SIGNIFICANT CHANGE UP (ref 17–32)
CREAT SERPL-MCNC: 0.9 MG/DL — SIGNIFICANT CHANGE UP (ref 0.7–1.5)
CREAT SERPL-MCNC: 0.9 MG/DL — SIGNIFICANT CHANGE UP (ref 0.7–1.5)
EOSINOPHIL # BLD AUTO: 0.2 K/UL — SIGNIFICANT CHANGE UP (ref 0–0.7)
EOSINOPHIL NFR BLD AUTO: 2.8 % — SIGNIFICANT CHANGE UP (ref 0–8)
GLUCOSE SERPL-MCNC: 119 MG/DL — HIGH (ref 70–99)
GLUCOSE SERPL-MCNC: 94 MG/DL — SIGNIFICANT CHANGE UP (ref 70–99)
HCT VFR BLD CALC: 36.6 % — LOW (ref 37–47)
HCT VFR BLD CALC: 38.8 % — SIGNIFICANT CHANGE UP (ref 37–47)
HGB BLD-MCNC: 11.8 G/DL — LOW (ref 12–16)
HGB BLD-MCNC: 12.5 G/DL — SIGNIFICANT CHANGE UP (ref 12–16)
IMM GRANULOCYTES NFR BLD AUTO: 0 % — LOW (ref 0.1–0.3)
LYMPHOCYTES # BLD AUTO: 3.1 K/UL — SIGNIFICANT CHANGE UP (ref 1.2–3.4)
LYMPHOCYTES # BLD AUTO: 43.9 % — SIGNIFICANT CHANGE UP (ref 20.5–51.1)
MAGNESIUM SERPL-MCNC: 2 MG/DL — SIGNIFICANT CHANGE UP (ref 1.8–2.4)
MAGNESIUM SERPL-MCNC: 2.1 MG/DL — SIGNIFICANT CHANGE UP (ref 1.8–2.4)
MCHC RBC-ENTMCNC: 27.5 PG — SIGNIFICANT CHANGE UP (ref 27–31)
MCHC RBC-ENTMCNC: 27.9 PG — SIGNIFICANT CHANGE UP (ref 27–31)
MCHC RBC-ENTMCNC: 32.2 G/DL — SIGNIFICANT CHANGE UP (ref 32–37)
MCHC RBC-ENTMCNC: 32.2 G/DL — SIGNIFICANT CHANGE UP (ref 32–37)
MCV RBC AUTO: 85.5 FL — SIGNIFICANT CHANGE UP (ref 81–99)
MCV RBC AUTO: 86.5 FL — SIGNIFICANT CHANGE UP (ref 81–99)
MONOCYTES # BLD AUTO: 0.56 K/UL — SIGNIFICANT CHANGE UP (ref 0.1–0.6)
MONOCYTES NFR BLD AUTO: 7.9 % — SIGNIFICANT CHANGE UP (ref 1.7–9.3)
NEUTROPHILS # BLD AUTO: 3.18 K/UL — SIGNIFICANT CHANGE UP (ref 1.4–6.5)
NEUTROPHILS NFR BLD AUTO: 45.1 % — SIGNIFICANT CHANGE UP (ref 42.2–75.2)
NRBC # BLD: 0 /100 WBCS — SIGNIFICANT CHANGE UP (ref 0–0)
NRBC # BLD: 0 /100 WBCS — SIGNIFICANT CHANGE UP (ref 0–0)
PHOSPHATE SERPL-MCNC: 3.8 MG/DL — SIGNIFICANT CHANGE UP (ref 2.1–4.9)
PHOSPHATE SERPL-MCNC: 4.1 MG/DL — SIGNIFICANT CHANGE UP (ref 2.1–4.9)
PLATELET # BLD AUTO: 264 K/UL — SIGNIFICANT CHANGE UP (ref 130–400)
PLATELET # BLD AUTO: 292 K/UL — SIGNIFICANT CHANGE UP (ref 130–400)
POTASSIUM SERPL-MCNC: 4.1 MMOL/L — SIGNIFICANT CHANGE UP (ref 3.5–5)
POTASSIUM SERPL-MCNC: 4.3 MMOL/L — SIGNIFICANT CHANGE UP (ref 3.5–5)
POTASSIUM SERPL-SCNC: 4.1 MMOL/L — SIGNIFICANT CHANGE UP (ref 3.5–5)
POTASSIUM SERPL-SCNC: 4.3 MMOL/L — SIGNIFICANT CHANGE UP (ref 3.5–5)
RBC # BLD: 4.23 M/UL — SIGNIFICANT CHANGE UP (ref 4.2–5.4)
RBC # BLD: 4.54 M/UL — SIGNIFICANT CHANGE UP (ref 4.2–5.4)
RBC # FLD: 12.4 % — SIGNIFICANT CHANGE UP (ref 11.5–14.5)
RBC # FLD: 12.5 % — SIGNIFICANT CHANGE UP (ref 11.5–14.5)
SODIUM SERPL-SCNC: 138 MMOL/L — SIGNIFICANT CHANGE UP (ref 135–146)
SODIUM SERPL-SCNC: 141 MMOL/L — SIGNIFICANT CHANGE UP (ref 135–146)
WBC # BLD: 7.06 K/UL — SIGNIFICANT CHANGE UP (ref 4.8–10.8)
WBC # BLD: 8.63 K/UL — SIGNIFICANT CHANGE UP (ref 4.8–10.8)
WBC # FLD AUTO: 7.06 K/UL — SIGNIFICANT CHANGE UP (ref 4.8–10.8)
WBC # FLD AUTO: 8.63 K/UL — SIGNIFICANT CHANGE UP (ref 4.8–10.8)

## 2019-12-01 PROCEDURE — 99232 SBSQ HOSP IP/OBS MODERATE 35: CPT | Mod: 24

## 2019-12-01 RX ORDER — OXYCODONE HYDROCHLORIDE 5 MG/1
5 TABLET ORAL EVERY 6 HOURS
Refills: 0 | Status: DISCONTINUED | OUTPATIENT
Start: 2019-12-01 | End: 2019-12-02

## 2019-12-01 RX ADMIN — TRAMADOL HYDROCHLORIDE 25 MILLIGRAM(S): 50 TABLET ORAL at 01:16

## 2019-12-01 RX ADMIN — Medication 15 MILLIGRAM(S): at 17:15

## 2019-12-01 RX ADMIN — Medication 15 MILLIGRAM(S): at 16:53

## 2019-12-01 RX ADMIN — PIPERACILLIN AND TAZOBACTAM 25 GRAM(S): 4; .5 INJECTION, POWDER, LYOPHILIZED, FOR SOLUTION INTRAVENOUS at 13:20

## 2019-12-01 RX ADMIN — PIPERACILLIN AND TAZOBACTAM 25 GRAM(S): 4; .5 INJECTION, POWDER, LYOPHILIZED, FOR SOLUTION INTRAVENOUS at 21:31

## 2019-12-01 RX ADMIN — PANTOPRAZOLE SODIUM 40 MILLIGRAM(S): 20 TABLET, DELAYED RELEASE ORAL at 11:14

## 2019-12-01 RX ADMIN — TRAMADOL HYDROCHLORIDE 25 MILLIGRAM(S): 50 TABLET ORAL at 19:40

## 2019-12-01 RX ADMIN — OXYCODONE HYDROCHLORIDE 5 MILLIGRAM(S): 5 TABLET ORAL at 11:15

## 2019-12-01 RX ADMIN — TRAMADOL HYDROCHLORIDE 25 MILLIGRAM(S): 50 TABLET ORAL at 02:00

## 2019-12-01 RX ADMIN — PIPERACILLIN AND TAZOBACTAM 25 GRAM(S): 4; .5 INJECTION, POWDER, LYOPHILIZED, FOR SOLUTION INTRAVENOUS at 05:33

## 2019-12-01 RX ADMIN — TRAMADOL HYDROCHLORIDE 25 MILLIGRAM(S): 50 TABLET ORAL at 08:48

## 2019-12-01 RX ADMIN — OXYCODONE HYDROCHLORIDE 5 MILLIGRAM(S): 5 TABLET ORAL at 12:05

## 2019-12-01 RX ADMIN — Medication 500 MILLIGRAM(S): at 05:43

## 2019-12-01 RX ADMIN — TRAMADOL HYDROCHLORIDE 25 MILLIGRAM(S): 50 TABLET ORAL at 09:40

## 2019-12-01 RX ADMIN — TRAMADOL HYDROCHLORIDE 25 MILLIGRAM(S): 50 TABLET ORAL at 20:10

## 2019-12-01 NOTE — PROGRESS NOTE ADULT - ATTENDING COMMENTS
30yo female who presented with severe abdominal pain s/p diagnostic laparoscopy and appendectomy 11/27. Patient has been receiving IV cipro/flagyl upgraded to Zosyn yesterday. UA negative, C diff negative, stool studies pending. TB test pending. GI consulted. At this time, patient reports continued abdominal pain that has improved since yesterday. She continues to have loose, non-formed stools. She is not tolerating regular diet. Exam improved since yesterday - less RLQ and suprapubic tenderness on exam without peritonitis. Incisions healing well. Encourage PO intake as tolerates. Pain control, serial abdominal exams. Encourage ambulation/OOB, incentive spirometer, DVT ppx.

## 2019-12-01 NOTE — PROVIDER CONTACT NOTE (OTHER) - SITUATION
RN contacted Md regarding pts iv infilitration and inability to gain new access. Pt is a tough stick and had last IV placed via US. MD verbalized understanding and will insert IV when able.

## 2019-12-01 NOTE — PROGRESS NOTE ADULT - SUBJECTIVE AND OBJECTIVE BOX
GENERAL SURGERY PROGRESS NOTE     SANCHO BASSETT  [address] [place] [address] day :4d  POD:  Procedure: Transversus abdominis plane (TAP) nerve block  Appendectomy, laparoscopic  Diagnostic laparoscopy    Surgical Attending: Keron Molina  Overnight events: No acute events overnight, patient tolerating diet, has mild complaints of RLQ pain.  Passing small BM and gas.  Denies and V/F/Chills.     T(F): 97.5 (19 @ 00:00), Max: 98.2 (19 @ 15:28)  HR: 69 (19 @ 00:00) (64 - 80)  BP: 112/63 (19 @ 00:00) (102/55 - 112/63)  ABP: --  ABP(mean): --  RR: 15 (19 @ 00:00) (15 - 18)  SpO2: --      19 @ 07:01  -  19 @ 02:53  --------------------------------------------------------  IN:    Oral Fluid: 600 mL  Total IN: 600 mL    OUT:    Voided: 600 mL  Total OUT: 600 mL    Total NET: 0 mL     GI proph:  pantoprazole  Injectable 40 milliGRAM(s) IV Push daily    AC/ proph: heparin  Injectable 5000 Unit(s) SubCutaneous every 8 hours    ABx: piperacillin/tazobactam IVPB.. 3.375 Gram(s) IV Intermittent every 8 hours      PHYSICAL EXAM:  GENERAL: NAD, well-appearing  CHEST/LUNG: Clear to auscultation bilaterally  HEART: Regular rate and rhythm  ABDOMEN: Soft, Nontender, Nondistended; incision CDI  EXTREMITIES:  No clubbing, cyanosis, or edema      LABS  Labs:  CAPILLARY BLOOD GLUCOSE                              11.8   7.06  )-----------( 264      ( 01 Dec 2019 00:24 )             36.6       Auto Neutrophil %: 45.1 % (19 @ 00:24)  Auto Immature Granulocyte %: 0.0 % (19 @ 00:24)        141  |  107  |  14  ----------------------------<  119<H>  4.1   |  22  |  0.9      Calcium, Total Serum: 8.8 mg/dL (19 @ 00:24)      Urinalysis Basic - ( 2019 09:12 )    Color: Light Yellow / Appearance: Clear / S.011 / pH: x  Gluc: x / Ketone: Trace  / Bili: Negative / Urobili: <2 mg/dL   Blood: x / Protein: Negative / Nitrite: Negative   Leuk Esterase: Negative / RBC: x / WBC x   Sq Epi: x / Non Sq Epi: x / Bacteria: x

## 2019-12-01 NOTE — PROGRESS NOTE ADULT - ASSESSMENT
29yFemale HD2/POD 1 from diagnostic laparoscopy and laparoscopic appendectomy.     Plan:  -F/U outpatient with GI for colonoscopy (Dr. Baez)   -Adequate pain control   -Regular diet   -Continue cipro/flagyl

## 2019-12-02 ENCOUNTER — TRANSCRIPTION ENCOUNTER (OUTPATIENT)
Age: 29
End: 2019-12-02

## 2019-12-02 VITALS
SYSTOLIC BLOOD PRESSURE: 110 MMHG | TEMPERATURE: 98 F | DIASTOLIC BLOOD PRESSURE: 60 MMHG | HEART RATE: 72 BPM | RESPIRATION RATE: 18 BRPM

## 2019-12-02 LAB — SURGICAL PATHOLOGY STUDY: SIGNIFICANT CHANGE UP

## 2019-12-02 RX ORDER — OXYCODONE HYDROCHLORIDE 5 MG/1
1 TABLET ORAL
Qty: 12 | Refills: 0
Start: 2019-12-02 | End: 2019-12-04

## 2019-12-02 RX ORDER — TRAMADOL HYDROCHLORIDE 50 MG/1
0.5 TABLET ORAL
Qty: 6 | Refills: 0
Start: 2019-12-02 | End: 2019-12-04

## 2019-12-02 RX ADMIN — PIPERACILLIN AND TAZOBACTAM 25 GRAM(S): 4; .5 INJECTION, POWDER, LYOPHILIZED, FOR SOLUTION INTRAVENOUS at 05:23

## 2019-12-02 RX ADMIN — OXYCODONE HYDROCHLORIDE 5 MILLIGRAM(S): 5 TABLET ORAL at 00:01

## 2019-12-02 RX ADMIN — OXYCODONE HYDROCHLORIDE 5 MILLIGRAM(S): 5 TABLET ORAL at 00:31

## 2019-12-02 RX ADMIN — TRAMADOL HYDROCHLORIDE 25 MILLIGRAM(S): 50 TABLET ORAL at 11:43

## 2019-12-02 RX ADMIN — PANTOPRAZOLE SODIUM 40 MILLIGRAM(S): 20 TABLET, DELAYED RELEASE ORAL at 12:14

## 2019-12-02 RX ADMIN — PIPERACILLIN AND TAZOBACTAM 25 GRAM(S): 4; .5 INJECTION, POWDER, LYOPHILIZED, FOR SOLUTION INTRAVENOUS at 12:47

## 2019-12-02 RX ADMIN — TRAMADOL HYDROCHLORIDE 25 MILLIGRAM(S): 50 TABLET ORAL at 12:16

## 2019-12-02 NOTE — DISCHARGE NOTE PROVIDER - HOSPITAL COURSE
29F presents to ED with 2 days of RLQ pain, worsening over the last day. The pain was periumbilical then localized over the RLQ. The patient reports significant nausea, no vomiting, no diarrhea, afebrile. Reports constipation, took some stool softeners yesterday with some relief. The patient reports her LMP  was 2 weeks ago and normal. The patient denies chest pain, shortness of breath, urinary symptoms. CT scan was significant for eccentric wall thickening and inflammation which appears centered on the lateral wall of the cecum. The appendiceal base appears normal and contrast-filled with the visualized portion of the mid to distal appendix     	appearing upper limits of normal at 6 mm and slightly fluid-filled although the tip is not definitively visualized. While tip appendicitis is not entirely excluded, a primarily cecal infectious/inflammatory etiology is overall favored over appendicitis given the normal appendiceal base and given that the inflammation is centered on the lateral wall of the cecum, possibly on the basis of cecal diverticulitis. No free air or abscess. Patient was taken to the OR on 11/27 for diagnostic laparoscopy and laparoscopic appendectomy, findings significant for inflamed ascending colon, normal appendix with normal base. Peritoneal fluid was sent for cytology. Patient seen by Gastroenterology as inpatient, recommendation is to follow-up as outpatient for colonoscopy. Patient has had no postoperative complications, she is tolerating a regular diet, she is ambulating, and her pain is well-controlled on ibuprofen and Tylenol. She is stable and ready for discharge home on antibiotics for 1 week (Augmentin) with follow-up as outpatient with Dr. Molina and Gastroenterology.

## 2019-12-02 NOTE — DISCHARGE NOTE PROVIDER - NSDCFUADDINST_GEN_ALL_CORE_FT
You are being discharged from [facility]. Please call the phone number provided and schedule a follow-up appointment in Dr. Molina's office this week. Please follow up with Dr. Baez at the phone number provided with Gastroenterology. You have been prescribed Augmentin for antibiotic coverage for 7 days, please take as directed and for the entire course prescribed. Please avoid heavy weight lifting for the next 4-6 weeks.  If you have any further questions about your care, please do not hesitate to contact Dr. Molina's clinic. You are being discharged from [facility]. Please call the phone number provided and schedule a follow-up appointment in Dr. Molina's office this week. Please follow up with Dr. Baez at the phone number provided with Gastroenterology. You have been prescribed Augmentin for antibiotic coverage for 7 days, please take as directed and for the entire course prescribed. Please take over the counter Ibuprofen and Tylenol for pain control. Please avoid heavy weight lifting for the next 4-6 weeks.  If you have any further questions about your care, please do not hesitate to contact Dr. Molina's clinic.

## 2019-12-02 NOTE — CHART NOTE - NSCHARTNOTEFT_GEN_A_CORE
Registered Dietitian Limited Note    Pt was seen by RD for full assessment, however will not complete dietitian initial assessment note, as pt is getting discharged at this time. Pt was provided with education materials as requested on Crohn's disease and UC; pt pending full GI workup, however suspicious that she may have some sort of inflammatory disease given intermittent episodes of constipation/diarrhea for the past couple of months. Pt and pt's mother at b/s able to verbalize understanding/agreement following diet ed; no need for additional discharge materials at this time.

## 2019-12-02 NOTE — DISCHARGE NOTE NURSING/CASE MANAGEMENT/SOCIAL WORK - PATIENT PORTAL LINK FT
You can access the FollowMyHealth Patient Portal offered by [facility] by registering at the following website: [email or web address]. By joining [employer]’s FollowMyHealth portal, you will also be able to view your health information using other applications (apps) compatible with our system.

## 2019-12-02 NOTE — PROGRESS NOTE ADULT - SUBJECTIVE AND OBJECTIVE BOX
GENERAL SURGERY PROGRESS NOTE     SANCHO BASSETT  29y  Female  Hospital day: 6d  POD: 5d  Procedure: Transversus abdominis plane (TAP) nerve block  Appendectomy, laparoscopic  Diagnostic laparoscopy    OVERNIGHT EVENTS: patient continues to tolerate regular diet, denies nausea and vomiting, is passing gas and having BM, new onset LLQ pain after BM. Patient would like to d/c on PO Augmentin and follow up outpt.    T(F): 96.7 (19 @ 15:45), Max: 97.8 (19 @ 07:35)  HR: 75 (19 @ 15:45) (73 - 75)  BP: 114/80 (19 @ 15:45) (103/59 - 114/80)  RR: 18 (19 @ 15:45) (18 - 18)    GI proph:  pantoprazole  Injectable 40 milliGRAM(s) IV Push daily    AC/ proph: heparin  Injectable 5000 Unit(s) SubCutaneous every 8 hours    ABx: piperacillin/tazobactam IVPB.. 3.375 Gram(s) IV Intermittent every 8 hours    PHYSICAL EXAM:  GENERAL: NAD, well-appearing  CHEST/LUNG: Clear to auscultation bilaterally  HEART: Regular rate and rhythm  ABDOMEN: Soft, tender in RLQ and LLQ, Nondistended;   EXTREMITIES:  No clubbing, cyanosis, or edema    Labs:               12.5   8.63  )-----------( 292      ( 01 Dec 2019 21:52 )             38.8           138  |  103  |  13  ----------------------------<  94  4.3   |  25  |  0.9    Calcium, Total Serum: 9.2 mg/dL (19 @ 21:52)    Urinalysis Basic - ( 2019 09:12 )    Color: Light Yellow / Appearance: Clear / S.011 / pH: x  Gluc: x / Ketone: Trace  / Bili: Negative / Urobili: <2 mg/dL   Blood: x / Protein: Negative / Nitrite: Negative   Leuk Esterase: Negative / RBC: x / WBC x   Sq Epi: x / Non Sq Epi: x / Bacteria: x

## 2019-12-02 NOTE — PROGRESS NOTE ADULT - ASSESSMENT
Assessment:  29yFemale HD2/POD 1 from diagnostic laparoscopy and laparoscopic appendectomy. Still have RLQ and now LLQ abdominal pain.    Plan:  -F/U outpatient with GI for colonoscopy (Dr. Baez)   -F/U stool O&P  -F/U TB test  -Adequate pain control   -Regular diet   -Continue Unasyn

## 2019-12-02 NOTE — DISCHARGE NOTE PROVIDER - CARE PROVIDERS DIRECT ADDRESSES
[email or web address]

## 2019-12-03 LAB
GAMMA INTERFERON BACKGROUND BLD IA-ACNC: 0.04 IU/ML — SIGNIFICANT CHANGE UP
M TB IFN-G BLD-IMP: NEGATIVE — SIGNIFICANT CHANGE UP
M TB IFN-G CD4+ BCKGRND COR BLD-ACNC: -0.02 IU/ML — SIGNIFICANT CHANGE UP
M TB IFN-G CD4+CD8+ BCKGRND COR BLD-ACNC: 0 IU/ML — SIGNIFICANT CHANGE UP
QUANT TB PLUS MITOGEN MINUS NIL: 8.08 IU/ML — SIGNIFICANT CHANGE UP

## 2019-12-09 DIAGNOSIS — K57.52 DIVERTICULITIS OF BOTH SMALL AND LARGE INTESTINE WITHOUT PERFORATION OR ABSCESS WITHOUT BLEEDING: ICD-10-CM

## 2019-12-09 DIAGNOSIS — K59.00 CONSTIPATION, UNSPECIFIED: ICD-10-CM

## 2019-12-09 DIAGNOSIS — K35.80 UNSPECIFIED ACUTE APPENDICITIS: ICD-10-CM

## 2019-12-09 DIAGNOSIS — Z88.8 ALLERGY STATUS TO OTHER DRUGS, MEDICAMENTS AND BIOLOGICAL SUBSTANCES: ICD-10-CM

## 2019-12-10 PROBLEM — Z97.4 PRESENCE OF EXTERNAL HEARING-AID: Chronic | Status: ACTIVE | Noted: 2019-11-27

## 2019-12-11 ENCOUNTER — APPOINTMENT (OUTPATIENT)
Dept: SURGERY | Facility: CLINIC | Age: 29
End: 2019-12-11
Payer: COMMERCIAL

## 2019-12-11 VITALS
HEART RATE: 104 BPM | WEIGHT: 118 LBS | HEIGHT: 62 IN | SYSTOLIC BLOOD PRESSURE: 118 MMHG | DIASTOLIC BLOOD PRESSURE: 62 MMHG | BODY MASS INDEX: 21.71 KG/M2 | TEMPERATURE: 98.4 F

## 2019-12-11 DIAGNOSIS — K57.32 DIVERTICULITIS OF LARGE INTESTINE W/OUT PERFORATION OR ABSCESS W/OUT BLEEDING: ICD-10-CM

## 2019-12-11 DIAGNOSIS — K35.80 UNSPECIFIED ACUTE APPENDICITIS: ICD-10-CM

## 2019-12-11 PROCEDURE — 99024 POSTOP FOLLOW-UP VISIT: CPT

## 2019-12-13 PROBLEM — K35.80 ACUTE APPENDICITIS: Status: ACTIVE | Noted: 2019-12-13

## 2019-12-13 PROBLEM — K57.32 CECAL DIVERTICULITIS: Status: ACTIVE | Noted: 2019-12-13

## 2019-12-13 NOTE — HISTORY OF PRESENT ILLNESS
[de-identified] : Martha is a 29  year old lady who had cecal diverticulitis. She was taken to the OR a diagnostic laparoscopy showed inflammation of the lateral wall of the cecum and ascending colon. The appendix appeared largely normal. An Appendectomy was done and she was discharged. She has improved\par She has resumed work . \par Her incisions have healed well. She still has some lower quadrant pain. She still has issues with bowel movements. \par She is seeign Dr. Stewart for it.

## 2019-12-13 NOTE — ASSESSMENT
[FreeTextEntry1] : Martha is a 29  year old lady who had cecal diverticulitis. She was taken to the OR a diagnostic laparoscopy showed inflammation of the lateral wall of the cecum and ascending colon. The appendix appeared largely normal. An Appendectomy was done and she was discharged. She has improved\par She has resumed work . \par Her incisions have healed well. She still has some lower quadrant pain. She still has issues with bowel movements. \par She is seeing Dr. Stewart for it. \par \par We explained in great detail the pathophysiology of the disease process. We sandra diagrams and discussed the workup for diagnosis and management.\par The Post operative care was explained to the patient. She was counselled on diet , exercise and wound care.\par We discussed the pathology and surgery with her.\par \par She is going to follow up with Dr. Stewart . After the colonoscopy she will follow up with me. \par \par We discussed the importance of close follow up. \par We informed that she needs to follow up in 1-2 months  .\par We also informed that she can call us if anything changes or has any questions.\par

## 2019-12-13 NOTE — PHYSICAL EXAM
[JVD] : no jugular venous distention  [Respiratory Effort] : normal respiratory effort [Alert] : alert [Calm] : calm [de-identified] : Normal [de-identified] : Normal [de-identified] : Normal , healed incisions

## 2020-01-06 ENCOUNTER — TRANSCRIPTION ENCOUNTER (OUTPATIENT)
Age: 30
End: 2020-01-06

## 2020-01-06 ENCOUNTER — RESULT REVIEW (OUTPATIENT)
Age: 30
End: 2020-01-06

## 2020-01-06 ENCOUNTER — OUTPATIENT (OUTPATIENT)
Dept: OUTPATIENT SERVICES | Facility: HOSPITAL | Age: 30
LOS: 1 days | Discharge: HOME | End: 2020-01-06
Payer: COMMERCIAL

## 2020-01-06 VITALS
DIASTOLIC BLOOD PRESSURE: 75 MMHG | TEMPERATURE: 98 F | HEART RATE: 88 BPM | WEIGHT: 111.99 LBS | SYSTOLIC BLOOD PRESSURE: 108 MMHG | RESPIRATION RATE: 17 BRPM | OXYGEN SATURATION: 100 %

## 2020-01-06 VITALS — HEART RATE: 79 BPM | RESPIRATION RATE: 17 BRPM | DIASTOLIC BLOOD PRESSURE: 60 MMHG | SYSTOLIC BLOOD PRESSURE: 96 MMHG

## 2020-01-06 DIAGNOSIS — Z98.890 OTHER SPECIFIED POSTPROCEDURAL STATES: Chronic | ICD-10-CM

## 2020-01-06 DIAGNOSIS — K38.9 DISEASE OF APPENDIX, UNSPECIFIED: Chronic | ICD-10-CM

## 2020-01-06 PROCEDURE — 88305 TISSUE EXAM BY PATHOLOGIST: CPT | Mod: 26

## 2020-01-06 PROCEDURE — 45380 COLONOSCOPY AND BIOPSY: CPT | Mod: XS

## 2020-01-06 PROCEDURE — 43239 EGD BIOPSY SINGLE/MULTIPLE: CPT | Mod: XS

## 2020-01-06 PROCEDURE — 88312 SPECIAL STAINS GROUP 1: CPT | Mod: 26

## 2020-01-06 RX ORDER — OFLOXACIN 0.3 %
1 DROPS OPHTHALMIC (EYE) EVERY 6 HOURS
Refills: 0 | Status: DISCONTINUED | OUTPATIENT
Start: 2020-01-06 | End: 2020-02-05

## 2020-01-06 NOTE — H&P PST ADULT - HISTORY OF PRESENT ILLNESS
Patient was recently admitted. CT showed possible appendicitis. She underwent appendectomy and the ascending colon was noted to be inflamed. Symptoms persist. Colonoscopy today

## 2020-01-06 NOTE — ASU PREOP CHECKLIST - TO WHOM
A Medina opportunity for questions/ answers rendered HEMA Medina RN  opportunity for questions/ answers rendered; b/l hearing aides intact b/l contact lenses removed

## 2020-01-06 NOTE — CHART NOTE - NSCHARTNOTEFT_GEN_A_CORE
PACU ANESTHESIA ADMISSION NOTE      Procedure:   Post op diagnosis:      ____  Intubated  TV:______       Rate: ______      FiO2: ______    __x__  Patent Airway    __x__  Full return of protective reflexes    ___x_  Full recovery from anesthesia / back to baseline status    Vitals:  T(C): 36.7 (01-06-20 @ 14:34), Max: 36.7 (01-06-20 @ 13:21)  HR: 88 (01-06-20 @ 14:34) (88 - 88)  BP: 108/75 (01-06-20 @ 14:34) (108/75 - 108/75)  RR: 17 (01-06-20 @ 14:34) (17 - 17)  SpO2: 100% (01-06-20 @ 14:34) (100% - 100%)    Mental Status:  __x__ Awake   __x___ Alert   _____ Drowsy   _____ Sedated    Nausea/Vomiting:  ____ NO  ___x___Yes,   See Post - Op Orders          Pain Scale (0-10):  _____    Treatment: ____ None    ___x_ See Post - Op/PCA Orders    Post - Operative Fluids:   ____ Oral   x____ See Post - Op Orders    Plan: Discharge:   __x__Home       _____Floor     _____Critical Care    _____  Other:_________________    Comments: uneventful anesthesia course no complications. VItals stable. Pt transferred to PACU

## 2020-01-06 NOTE — ANESTHESIA FOLLOW-UP NOTE - NSEVALATIONFT_GEN_ALL_CORE
patient complain of irritation of left eye, not resolved by BSS irrigation. Will follow cornea abrasion protocol.

## 2020-01-06 NOTE — ASU DISCHARGE PLAN (ADULT/PEDIATRIC) - CALL YOUR DOCTOR IF YOU HAVE ANY OF THE FOLLOWING:
Bleeding that does not stop/Excessive diarrhea/Nausea and vomiting that does not stop/Fever greater than (need to indicate Fahrenheit or Celsius)/Numbness, tingling, color or temperature change to extremity/Swelling that gets worse/Inability to tolerate liquids or foods/Pain not relieved by Medications/Increased irritability or sluggishness

## 2020-01-09 LAB
SURGICAL PATHOLOGY STUDY: SIGNIFICANT CHANGE UP
SURGICAL PATHOLOGY STUDY: SIGNIFICANT CHANGE UP

## 2020-01-13 DIAGNOSIS — K29.80 DUODENITIS WITHOUT BLEEDING: ICD-10-CM

## 2020-01-13 DIAGNOSIS — Z88.8 ALLERGY STATUS TO OTHER DRUGS, MEDICAMENTS AND BIOLOGICAL SUBSTANCES STATUS: ICD-10-CM

## 2020-01-13 DIAGNOSIS — R10.84 GENERALIZED ABDOMINAL PAIN: ICD-10-CM

## 2020-01-13 DIAGNOSIS — K29.80 DUODENITIS W/OUT BLEEDING: ICD-10-CM

## 2020-01-13 DIAGNOSIS — K29.50 UNSPECIFIED CHRONIC GASTRITIS WITHOUT BLEEDING: ICD-10-CM

## 2020-01-17 ENCOUNTER — LABORATORY RESULT (OUTPATIENT)
Age: 30
End: 2020-01-17

## 2020-01-30 LAB
CELIAC DISEASE INTERPRETATION: NORMAL
CELIAC GENE PAIRS PRESENT: NO
DQ ALPHA 1: NORMAL
DQ BETA 1: NORMAL
IMMUNOGLOBULIN A (IGA): 157 MG/DL

## 2020-03-25 ENCOUNTER — OUTPATIENT (OUTPATIENT)
Dept: OUTPATIENT SERVICES | Facility: HOSPITAL | Age: 30
LOS: 1 days | Discharge: HOME | End: 2020-03-25

## 2020-03-25 DIAGNOSIS — F90.0 ATTENTION-DEFICIT HYPERACTIVITY DISORDER, PREDOMINANTLY INATTENTIVE TYPE: ICD-10-CM

## 2020-03-25 DIAGNOSIS — K38.9 DISEASE OF APPENDIX, UNSPECIFIED: Chronic | ICD-10-CM

## 2020-03-25 DIAGNOSIS — Z98.890 OTHER SPECIFIED POSTPROCEDURAL STATES: Chronic | ICD-10-CM

## 2020-04-26 ENCOUNTER — MESSAGE (OUTPATIENT)
Age: 30
End: 2020-04-26

## 2020-05-02 ENCOUNTER — APPOINTMENT (OUTPATIENT)
Dept: DISASTER EMERGENCY | Facility: HOSPITAL | Age: 30
End: 2020-05-02

## 2020-05-04 LAB
SARS-COV-2 IGG SERPL IA-ACNC: <0.1 INDEX
SARS-COV-2 IGG SERPL QL IA: NEGATIVE

## 2020-05-07 ENCOUNTER — APPOINTMENT (OUTPATIENT)
Dept: DISASTER EMERGENCY | Facility: HOSPITAL | Age: 30
End: 2020-05-07

## 2020-08-17 ENCOUNTER — OUTPATIENT (OUTPATIENT)
Dept: OUTPATIENT SERVICES | Facility: HOSPITAL | Age: 30
LOS: 1 days | Discharge: HOME | End: 2020-08-17

## 2020-08-17 ENCOUNTER — APPOINTMENT (OUTPATIENT)
Dept: OBGYN | Facility: CLINIC | Age: 30
End: 2020-08-17
Payer: COMMERCIAL

## 2020-08-17 VITALS
SYSTOLIC BLOOD PRESSURE: 112 MMHG | BODY MASS INDEX: 21.35 KG/M2 | WEIGHT: 116 LBS | DIASTOLIC BLOOD PRESSURE: 70 MMHG | HEIGHT: 62 IN

## 2020-08-17 DIAGNOSIS — K38.9 DISEASE OF APPENDIX, UNSPECIFIED: Chronic | ICD-10-CM

## 2020-08-17 DIAGNOSIS — Z98.890 OTHER SPECIFIED POSTPROCEDURAL STATES: Chronic | ICD-10-CM

## 2020-08-17 DIAGNOSIS — Z01.419 ENCOUNTER FOR GYNECOLOGICAL EXAMINATION (GENERAL) (ROUTINE) W/OUT ABNORMAL FINDINGS: ICD-10-CM

## 2020-08-17 PROCEDURE — 99385 PREV VISIT NEW AGE 18-39: CPT

## 2020-08-17 NOTE — PHYSICAL EXAM
[Awake] : awake [Alert] : alert [Acute Distress] : no acute distress [Mass] : no breast mass [Nipple Discharge] : no nipple discharge [Axillary LAD] : no axillary lymphadenopathy [Soft] : soft [Tender] : non tender [Oriented x3] : oriented to person, place, and time [Labia Majora] : labia major [Labia Minora] : labia minora [Normal] : clitoris [Retroversion] : retroverted [No Bleeding] : there was no active vaginal bleeding [Uterine Adnexae] : were not tender and not enlarged

## 2020-08-17 NOTE — HISTORY OF PRESENT ILLNESS
[Reproductive Age] : is of reproductive age [de-identified] : up to date [Definite:  ___ (Date)] : the last menstrual period was [unfilled] [Normal Amount/Duration] : was of a normal amount and duration [Spotting Between  Menses] : no spotting between menses [Sexually Active] : is sexually active [Monogamous] : is monogamous [Male ___] : [unfilled] male
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
## 2020-08-20 LAB — HPV HIGH+LOW RISK DNA PNL CVX: NOT DETECTED

## 2020-09-04 ENCOUNTER — APPOINTMENT (OUTPATIENT)
Dept: OBGYN | Facility: CLINIC | Age: 30
End: 2020-09-04

## 2020-12-15 ENCOUNTER — OUTPATIENT (OUTPATIENT)
Dept: OUTPATIENT SERVICES | Facility: HOSPITAL | Age: 30
LOS: 1 days | Discharge: HOME | End: 2020-12-15

## 2020-12-15 DIAGNOSIS — Z98.890 OTHER SPECIFIED POSTPROCEDURAL STATES: Chronic | ICD-10-CM

## 2020-12-15 DIAGNOSIS — K38.9 DISEASE OF APPENDIX, UNSPECIFIED: Chronic | ICD-10-CM

## 2020-12-16 DIAGNOSIS — F90.0 ATTENTION-DEFICIT HYPERACTIVITY DISORDER, PREDOMINANTLY INATTENTIVE TYPE: ICD-10-CM

## 2020-12-17 ENCOUNTER — APPOINTMENT (OUTPATIENT)
Dept: OBGYN | Facility: CLINIC | Age: 30
End: 2020-12-17

## 2021-01-04 ENCOUNTER — APPOINTMENT (OUTPATIENT)
Dept: OBGYN | Facility: CLINIC | Age: 31
End: 2021-01-04

## 2021-01-18 ENCOUNTER — OUTPATIENT (OUTPATIENT)
Dept: OUTPATIENT SERVICES | Facility: HOSPITAL | Age: 31
LOS: 1 days | Discharge: HOME | End: 2021-01-18

## 2021-01-18 DIAGNOSIS — Z98.890 OTHER SPECIFIED POSTPROCEDURAL STATES: Chronic | ICD-10-CM

## 2021-01-18 DIAGNOSIS — N97.9 FEMALE INFERTILITY, UNSPECIFIED: ICD-10-CM

## 2021-01-18 DIAGNOSIS — K38.9 DISEASE OF APPENDIX, UNSPECIFIED: Chronic | ICD-10-CM

## 2021-03-08 ENCOUNTER — TRANSCRIPTION ENCOUNTER (OUTPATIENT)
Age: 31
End: 2021-03-08

## 2021-04-05 ENCOUNTER — NON-APPOINTMENT (OUTPATIENT)
Age: 31
End: 2021-04-05

## 2021-04-09 RX ORDER — CLOMIPHENE CITRATE 50 MG/1
50 TABLET ORAL DAILY
Qty: 5 | Refills: 4 | Status: ACTIVE | COMMUNITY
Start: 2021-04-09 | End: 1900-01-01

## 2021-05-06 RX ORDER — CYPROHEPTADINE HYDROCHLORIDE 4 MG/1
1 TABLET ORAL
Qty: 120 | Refills: 0
Start: 2021-05-06 | End: 2021-06-04

## 2021-05-11 ENCOUNTER — APPOINTMENT (OUTPATIENT)
Age: 31
End: 2021-05-11
Payer: COMMERCIAL

## 2021-05-11 VITALS
HEART RATE: 98 BPM | HEIGHT: 62 IN | DIASTOLIC BLOOD PRESSURE: 70 MMHG | WEIGHT: 116 LBS | RESPIRATION RATE: 14 BRPM | BODY MASS INDEX: 21.35 KG/M2 | SYSTOLIC BLOOD PRESSURE: 117 MMHG | OXYGEN SATURATION: 99 %

## 2021-05-11 DIAGNOSIS — Z83.3 FAMILY HISTORY OF DIABETES MELLITUS: ICD-10-CM

## 2021-05-11 PROCEDURE — 99203 OFFICE O/P NEW LOW 30 MIN: CPT

## 2021-05-11 PROCEDURE — 99072 ADDL SUPL MATRL&STAF TM PHE: CPT

## 2021-05-11 NOTE — PHYSICAL EXAM
[No Acute Distress] : no acute distress [II] : Mallampati Class: II [Normal Appearance] : normal appearance [No Neck Mass] : no neck mass [Normal Rate/Rhythm] : normal rate/rhythm [Normal S1, S2] : normal s1, s2 [No Murmurs] : no murmurs [No Resp Distress] : no resp distress [Clear to Auscultation Bilaterally] : clear to auscultation bilaterally [Benign] : benign [Normal Gait] : normal gait [No Clubbing] : no clubbing [No Cyanosis] : no cyanosis [No Edema] : no edema
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
## 2021-05-26 ENCOUNTER — OUTPATIENT (OUTPATIENT)
Dept: OUTPATIENT SERVICES | Facility: HOSPITAL | Age: 31
LOS: 1 days | Discharge: HOME | End: 2021-05-26

## 2021-05-26 ENCOUNTER — NON-APPOINTMENT (OUTPATIENT)
Age: 31
End: 2021-05-26

## 2021-05-26 ENCOUNTER — APPOINTMENT (OUTPATIENT)
Dept: OBGYN | Facility: CLINIC | Age: 31
End: 2021-05-26
Payer: COMMERCIAL

## 2021-05-26 ENCOUNTER — RESULT CHARGE (OUTPATIENT)
Age: 31
End: 2021-05-26

## 2021-05-26 DIAGNOSIS — Z32.00 ENCOUNTER FOR PREGNANCY TEST, RESULT UNKNOWN: ICD-10-CM

## 2021-05-26 DIAGNOSIS — Z98.890 OTHER SPECIFIED POSTPROCEDURAL STATES: Chronic | ICD-10-CM

## 2021-05-26 DIAGNOSIS — K38.9 DISEASE OF APPENDIX, UNSPECIFIED: Chronic | ICD-10-CM

## 2021-05-26 LAB
HCG UR QL: POSITIVE
QUALITY CONTROL: YES

## 2021-05-26 PROCEDURE — 99213 OFFICE O/P EST LOW 20 MIN: CPT | Mod: 25

## 2021-05-26 PROCEDURE — 76815 OB US LIMITED FETUS(S): CPT | Mod: 26

## 2021-05-26 NOTE — COUNSELING
[FreeTextEntry2] : missed ab
Is This A New Presentation, Or A Follow-Up?: Nodule
How Severe Is Your Nodule?: mild
Year Removed: 1900

## 2021-05-26 NOTE — PLAN
[FreeTextEntry1] : early iup vs missed ab\par pt for quant bhcg\par blood type and ab screen\par Pt to call for results.

## 2021-05-26 NOTE — PHYSICAL EXAM
[Labia Majora] : normal [Labia Minora] : normal [Moderate] : There was moderate vaginal bleeding [Normal] : normal [Uterine Adnexae] : normal [FreeTextEntry5] : External os, finger tip, internal os closed

## 2021-05-27 LAB
ABO + RH PNL BLD: NORMAL
BLD GP AB SCN SERPL QL: NORMAL
HCG SERPL-MCNC: 203.7 MIU/ML

## 2021-05-31 LAB — HCG SERPL-MCNC: 67.2 MIU/ML

## 2021-06-04 ENCOUNTER — LABORATORY RESULT (OUTPATIENT)
Age: 31
End: 2021-06-04

## 2021-06-04 ENCOUNTER — OUTPATIENT (OUTPATIENT)
Dept: OUTPATIENT SERVICES | Facility: HOSPITAL | Age: 31
LOS: 1 days | Discharge: HOME | End: 2021-06-04

## 2021-06-04 DIAGNOSIS — K38.9 DISEASE OF APPENDIX, UNSPECIFIED: Chronic | ICD-10-CM

## 2021-06-04 DIAGNOSIS — Z11.59 ENCOUNTER FOR SCREENING FOR OTHER VIRAL DISEASES: ICD-10-CM

## 2021-06-04 DIAGNOSIS — Z98.890 OTHER SPECIFIED POSTPROCEDURAL STATES: Chronic | ICD-10-CM

## 2021-06-07 ENCOUNTER — OUTPATIENT (OUTPATIENT)
Dept: OUTPATIENT SERVICES | Facility: HOSPITAL | Age: 31
LOS: 1 days | Discharge: HOME | End: 2021-06-07
Payer: COMMERCIAL

## 2021-06-07 DIAGNOSIS — K38.9 DISEASE OF APPENDIX, UNSPECIFIED: Chronic | ICD-10-CM

## 2021-06-07 DIAGNOSIS — Z98.890 OTHER SPECIFIED POSTPROCEDURAL STATES: Chronic | ICD-10-CM

## 2021-06-07 PROCEDURE — 95810 POLYSOM 6/> YRS 4/> PARAM: CPT | Mod: 26

## 2021-06-08 DIAGNOSIS — G47.33 OBSTRUCTIVE SLEEP APNEA (ADULT) (PEDIATRIC): ICD-10-CM

## 2021-06-14 ENCOUNTER — APPOINTMENT (OUTPATIENT)
Dept: OBGYN | Facility: CLINIC | Age: 31
End: 2021-06-14

## 2021-06-22 ENCOUNTER — APPOINTMENT (OUTPATIENT)
Age: 31
End: 2021-06-22
Payer: COMMERCIAL

## 2021-06-22 VITALS
SYSTOLIC BLOOD PRESSURE: 118 MMHG | HEIGHT: 62 IN | DIASTOLIC BLOOD PRESSURE: 78 MMHG | RESPIRATION RATE: 12 BRPM | HEART RATE: 84 BPM | OXYGEN SATURATION: 100 % | WEIGHT: 116 LBS | BODY MASS INDEX: 21.35 KG/M2

## 2021-06-22 DIAGNOSIS — G47.33 OBSTRUCTIVE SLEEP APNEA (ADULT) (PEDIATRIC): ICD-10-CM

## 2021-06-22 PROCEDURE — 99213 OFFICE O/P EST LOW 20 MIN: CPT

## 2021-06-22 PROCEDURE — 99072 ADDL SUPL MATRL&STAF TM PHE: CPT

## 2021-06-22 NOTE — PHYSICAL EXAM
[No Acute Distress] : no acute distress [II] : Mallampati Class: II [Normal Appearance] : normal appearance [No Neck Mass] : no neck mass [Normal Rate/Rhythm] : normal rate/rhythm [Normal S1, S2] : normal s1, s2 [No Murmurs] : no murmurs [No Resp Distress] : no resp distress [Clear to Auscultation Bilaterally] : clear to auscultation bilaterally [No Clubbing] : no clubbing [No Cyanosis] : no cyanosis [No Edema] : no edema

## 2021-11-30 ENCOUNTER — TRANSCRIPTION ENCOUNTER (OUTPATIENT)
Age: 31
End: 2021-11-30

## 2022-03-29 ENCOUNTER — APPOINTMENT (OUTPATIENT)
Dept: ENDOCRINOLOGY | Facility: CLINIC | Age: 32
End: 2022-03-29
Payer: COMMERCIAL

## 2022-03-29 VITALS
BODY MASS INDEX: 24.84 KG/M2 | WEIGHT: 135 LBS | SYSTOLIC BLOOD PRESSURE: 118 MMHG | DIASTOLIC BLOOD PRESSURE: 72 MMHG | TEMPERATURE: 97.3 F | HEART RATE: 72 BPM | OXYGEN SATURATION: 98 % | HEIGHT: 62 IN

## 2022-03-29 DIAGNOSIS — Z83.511 FAMILY HISTORY OF GLAUCOMA: ICD-10-CM

## 2022-03-29 DIAGNOSIS — Z86.79 PERSONAL HISTORY OF OTHER DISEASES OF THE CIRCULATORY SYSTEM: ICD-10-CM

## 2022-03-29 DIAGNOSIS — Z86.69 PERSONAL HISTORY OF OTHER DISEASES OF THE NERVOUS SYSTEM AND SENSE ORGANS: ICD-10-CM

## 2022-03-29 DIAGNOSIS — Z82.49 FAMILY HISTORY OF ISCHEMIC HEART DISEASE AND OTHER DISEASES OF THE CIRCULATORY SYSTEM: ICD-10-CM

## 2022-03-29 DIAGNOSIS — E04.1 NONTOXIC SINGLE THYROID NODULE: ICD-10-CM

## 2022-03-29 DIAGNOSIS — Z78.9 OTHER SPECIFIED HEALTH STATUS: ICD-10-CM

## 2022-03-29 DIAGNOSIS — E03.9 HYPOTHYROIDISM, UNSPECIFIED: ICD-10-CM

## 2022-03-29 DIAGNOSIS — Z83.438 FAMILY HISTORY OF OTHER DISORDER OF LIPOPROTEIN METABOLISM AND OTHER LIPIDEMIA: ICD-10-CM

## 2022-03-29 DIAGNOSIS — Z86.39 PERSONAL HISTORY OF OTHER ENDOCRINE, NUTRITIONAL AND METABOLIC DISEASE: ICD-10-CM

## 2022-03-29 PROCEDURE — 99202 OFFICE O/P NEW SF 15 MIN: CPT

## 2022-03-30 PROBLEM — Z86.69 HISTORY OF HEARING LOSS: Status: RESOLVED | Noted: 2022-03-29 | Resolved: 2022-03-30

## 2022-03-30 PROBLEM — Z86.39 HISTORY OF HYPOTHYROIDISM: Status: RESOLVED | Noted: 2022-03-29 | Resolved: 2022-03-30

## 2022-03-30 PROBLEM — Z83.438 FAMILY HISTORY OF HYPERLIPIDEMIA: Status: ACTIVE | Noted: 2022-03-29

## 2022-03-30 PROBLEM — Z82.49 FAMILY HISTORY OF HYPERTENSION: Status: ACTIVE | Noted: 2022-03-29

## 2022-03-30 PROBLEM — Z86.79 HISTORY OF HYPERTENSION: Status: RESOLVED | Noted: 2022-03-29 | Resolved: 2022-03-30

## 2022-03-30 PROBLEM — Z83.511 FAMILY HISTORY OF GLAUCOMA: Status: ACTIVE | Noted: 2022-03-29

## 2022-03-30 PROBLEM — E03.9 ADULT HYPOTHYROIDISM: Status: ACTIVE | Noted: 2022-01-14

## 2022-03-30 PROBLEM — Z78.9 NON-SMOKER: Status: ACTIVE | Noted: 2022-03-29

## 2022-03-30 RX ORDER — ONDANSETRON 8 MG/1
8 TABLET ORAL
Qty: 30 | Refills: 0 | Status: ACTIVE | COMMUNITY
Start: 2022-03-24 | End: 1900-01-01

## 2022-03-30 RX ORDER — LEVOTHYROXINE SODIUM 50 UG/1
50 TABLET ORAL
Refills: 0 | Status: ACTIVE | COMMUNITY

## 2022-03-30 RX ORDER — LABETALOL HYDROCHLORIDE 100 MG/1
100 TABLET, FILM COATED ORAL
Refills: 0 | Status: ACTIVE | COMMUNITY

## 2022-03-30 RX ORDER — PNV/FERROUS SULFATE/FOLIC ACID 27-<0.5MG
TABLET ORAL
Refills: 0 | Status: ACTIVE | COMMUNITY

## 2022-03-30 NOTE — REASON FOR VISIT
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

## 2022-03-30 NOTE — ASSESSMENT
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 \par - good pill technique and compliant \par - had first miscarriage at 6 weeks pregnant and again recently at 23 weeks as she had preeclampsia , had D&E 3/5/22 \par - do labs now and base don results will adjust dose \par - check antibodies \par - do US as possible nodule on exam

## 2022-03-30 NOTE — REVIEW OF SYSTEMS
[Fatigue] : fatigue [Recent Weight Gain (___ Lbs)] : no recent weight gain [Recent Weight Loss (___ Lbs)] : no recent weight loss [Visual Field Defect] : no visual field defect [Blurred Vision] : no blurred vision [Dysphagia] : no dysphagia [Neck Pain] : no neck pain [Dysphonia] : no dysphonia [Chest Pain] : no chest pain [Palpitations] : no palpitations [Fast Heart Rate] : heart rate is not fast [Lower Ext Edema] : no lower extremity edema [Shortness Of Breath] : no shortness of breath [SOB on Exertion] : no shortness of breath on exertion [Nausea] : no nausea [Constipation] : no constipation [Vomiting] : no vomiting [Diarrhea] : no diarrhea [Dry Skin] : dry skin [Hair Loss] : hair loss [Cold Intolerance] : no cold intolerance [Heat Intolerance] : no heat intolerance [Galactorrhea] : no galactorrhea

## 2022-03-30 NOTE — PHYSICAL EXAM
[Alert] : alert [Well Nourished] : well nourished [Healthy Appearance] : healthy appearance [No Acute Distress] : no acute distress [No Proptosis] : no proptosis [No Lid Lag] : no lid lag [Thyroid Not Enlarged] : the thyroid was not enlarged [No Respiratory Distress] : no respiratory distress [No Accessory Muscle Use] : no accessory muscle use [Clear to Auscultation] : lungs were clear to auscultation bilaterally [Normal S1, S2] : normal S1 and S2 [No Murmurs] : no murmurs [Regular Rhythm] : with a regular rhythm [Not Tender] : non-tender [Soft] : abdomen soft [No Stigmata of Cushings Syndrome] : no stigmata of Cushings Syndrome [de-identified] : possible nodule over isthmus

## 2022-03-30 NOTE — HISTORY OF PRESENT ILLNESS
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 \par - good pill technique and compliant \par - had first miscarriage at 6 weeks pregnant and again recently at 23 weeks as she had preeclampsia , had D&E 3/5/22

## 2022-04-26 ENCOUNTER — RESULT REVIEW (OUTPATIENT)
Age: 32
End: 2022-04-26

## 2022-04-26 ENCOUNTER — OUTPATIENT (OUTPATIENT)
Dept: OUTPATIENT SERVICES | Facility: HOSPITAL | Age: 32
LOS: 1 days | Discharge: HOME | End: 2022-04-26
Payer: COMMERCIAL

## 2022-04-26 DIAGNOSIS — E04.1 NONTOXIC SINGLE THYROID NODULE: ICD-10-CM

## 2022-04-26 DIAGNOSIS — K38.9 DISEASE OF APPENDIX, UNSPECIFIED: Chronic | ICD-10-CM

## 2022-04-26 DIAGNOSIS — Z98.890 OTHER SPECIFIED POSTPROCEDURAL STATES: Chronic | ICD-10-CM

## 2022-04-26 PROCEDURE — 76536 US EXAM OF HEAD AND NECK: CPT | Mod: 26

## 2022-05-31 ENCOUNTER — LABORATORY RESULT (OUTPATIENT)
Age: 32
End: 2022-05-31

## 2022-06-24 ENCOUNTER — RX RENEWAL (OUTPATIENT)
Age: 32
End: 2022-06-24

## 2022-07-25 ENCOUNTER — EMERGENCY (EMERGENCY)
Facility: HOSPITAL | Age: 32
LOS: 0 days | Discharge: HOME | End: 2022-07-25
Attending: EMERGENCY MEDICINE | Admitting: EMERGENCY MEDICINE

## 2022-07-25 VITALS
DIASTOLIC BLOOD PRESSURE: 98 MMHG | HEIGHT: 60 IN | RESPIRATION RATE: 20 BRPM | SYSTOLIC BLOOD PRESSURE: 161 MMHG | TEMPERATURE: 98 F | OXYGEN SATURATION: 98 % | HEART RATE: 95 BPM | WEIGHT: 134.92 LBS

## 2022-07-25 DIAGNOSIS — Z98.890 OTHER SPECIFIED POSTPROCEDURAL STATES: Chronic | ICD-10-CM

## 2022-07-25 DIAGNOSIS — K38.9 DISEASE OF APPENDIX, UNSPECIFIED: Chronic | ICD-10-CM

## 2022-07-25 DIAGNOSIS — S50.861A INSECT BITE (NONVENOMOUS) OF RIGHT FOREARM, INITIAL ENCOUNTER: ICD-10-CM

## 2022-07-25 DIAGNOSIS — L53.8 OTHER SPECIFIED ERYTHEMATOUS CONDITIONS: ICD-10-CM
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
## 2022-07-25 DIAGNOSIS — L29.9 PRURITUS, UNSPECIFIED: ICD-10-CM

## 2022-07-25 DIAGNOSIS — Z88.8 ALLERGY STATUS TO OTHER DRUGS, MEDICAMENTS AND BIOLOGICAL SUBSTANCES STATUS: ICD-10-CM

## 2022-07-25 DIAGNOSIS — E03.9 HYPOTHYROIDISM, UNSPECIFIED: ICD-10-CM

## 2022-07-25 DIAGNOSIS — Z90.89 ACQUIRED ABSENCE OF OTHER ORGANS: ICD-10-CM

## 2022-07-25 DIAGNOSIS — I10 ESSENTIAL (PRIMARY) HYPERTENSION: ICD-10-CM

## 2022-07-25 DIAGNOSIS — T78.49XA OTHER ALLERGY, INITIAL ENCOUNTER: ICD-10-CM

## 2022-07-25 DIAGNOSIS — Z90.49 ACQUIRED ABSENCE OF OTHER SPECIFIED PARTS OF DIGESTIVE TRACT: ICD-10-CM

## 2022-07-25 DIAGNOSIS — M79.89 OTHER SPECIFIED SOFT TISSUE DISORDERS: ICD-10-CM

## 2022-07-25 PROCEDURE — 99284 EMERGENCY DEPT VISIT MOD MDM: CPT

## 2022-07-25 RX ORDER — DEXAMETHASONE 0.5 MG/5ML
10 ELIXIR ORAL ONCE
Refills: 0 | Status: COMPLETED | OUTPATIENT
Start: 2022-07-25 | End: 2022-07-25

## 2022-07-25 RX ORDER — FAMOTIDINE 10 MG/ML
20 INJECTION INTRAVENOUS ONCE
Refills: 0 | Status: COMPLETED | OUTPATIENT
Start: 2022-07-25 | End: 2022-07-25

## 2022-07-25 RX ADMIN — Medication 10 MILLIGRAM(S): at 02:21

## 2022-07-25 RX ADMIN — FAMOTIDINE 20 MILLIGRAM(S): 10 INJECTION INTRAVENOUS at 02:21

## 2022-07-25 NOTE — ED PROVIDER NOTE - NS ED ROS FT
Constitutional: (-) fever  Cardiovascular: (-) syncope  Integumentary: (-) rash  Musculoskeltal: insect bites to rigth arm x 4 hrs;  Neurological: (-) altered mental status

## 2022-07-25 NOTE — ED PROVIDER NOTE - NSFOLLOWUPINSTRUCTIONS_ED_ALL_ED_FT
General Allergic Reaction    WHAT YOU NEED TO KNOW:    An allergic reaction is your body's response to an allergen. Allergens include medicines, food, insect stings, animal dander, mold, latex, chemicals, and dust mites. Pollen from trees, grass, and weeds can also cause an allergic reaction. An allergic reaction can range from mild to severe.    DISCHARGE INSTRUCTIONS:    Call 911 for signs or symptoms of anaphylaxis, such as trouble breathing, swelling in your mouth or throat, or wheezing. You may also have itching, a rash, hives, or feel like you are going to faint.    Return to the emergency department if:     You have a skin rash, hives, swelling, or itching that is starting to get worse.      Your throat tightens, or your lips or tongue swell.      You have trouble swallowing or speaking.      You have worsening nausea, diarrhea, or abdominal cramps, or you are vomiting.      You have chest pain or tightness.    Contact your healthcare provider if:     You have questions or concerns about your condition or care.        Medicines: You may need any of the following:     Medicines may be given to relieve certain allergy symptoms such as itching, sneezing, and swelling. You may take them as a pill or use drops in your nose or eyes. Topical treatments may be given to put directly on your skin to help decrease itching or swelling.      Epinephrine may be prescribed if you are at risk for anaphylaxis. This is a severe allergic reaction that can be life-threatening. Your healthcare provider will tell you if you need to keep epinephrine with you. You will be taught when and how to use it.      Take your medicine as directed. Contact your healthcare provider if you think your medicine is not helping or if you have side effects. Tell him of her if you are allergic to any medicine. Keep a list of the medicines, vitamins, and herbs you take. Include the amounts, and when and why you take them. Bring the list or the pill bottles to follow-up visits. Carry your medicine list with you in case of an emergency.    Follow up with your healthcare provider as directed: Write down your questions so you remember to ask them during your visits.     Manage your symptoms:     Avoid allergens. You may need to have allergy testing with your healthcare provider or a specialist to find your allergens.      Use cold compresses on your skin or eyes. This will help soothe skin or eyes affected by the allergic reaction. You can make a cold compress by soaking a washcloth in cool water. Wring out the extra water before you apply the washcloth.      Rinse your nasal passages with a saline solution. Daily rinsing may help clear allergens out of your nose. Use distilled water if possible. You can also boil tap water and then let it cool before you use it. Do not use tap water without boiling it first.      Do not smoke. Nicotine and other chemicals in cigarettes and cigars can make an allergic reaction worse, and can also cause lung damage. Ask your healthcare provider for information if you currently smoke and need help to quit. E-cigarettes or smokeless tobacco still contain nicotine. Talk to your healthcare provider before you use these products.          © Copyright [employer] 2019 All illustrations and images included in CareNotes are the copyrighted property of [employer] Inc[employer] or [employer].

## 2022-07-25 NOTE — ED PROVIDER NOTE - NS ED ATTENDING STATEMENT MOD
This was a shared visit with the HAKAN. I reviewed and verified the documentation and independently performed the documented:

## 2022-07-25 NOTE — ED PROVIDER NOTE - CLINICAL SUMMARY MEDICAL DECISION MAKING FREE TEXT BOX
No distress.  VSS.  No signs of sepsis.  Local allergic reaction secondary to insect bite.  No signs of cellulitis.  D/C home.  Strict return instructions discussed.

## 2022-07-25 NOTE — ED PROVIDER NOTE - NSFOLLOWUPCLINICS_GEN_ALL_ED_FT
[facility]  Medicine  [address], [place]   Phone: [phone]  Fax:   Follow Up Time: 4-6 Days

## 2022-07-25 NOTE — ED PROVIDER NOTE - OBJECTIVE STATEMENT
32 yold female to ED Pmhx Htn, hypothroidism c/o redness, swelling itching to right arm s/p ?insect bites started this evening; pt attempted hydrocortisone cream without signif imp; pt denies sob, n/v, chest back or abdominal pain

## 2022-07-25 NOTE — ED PROVIDER NOTE - NSICDXPASTSURGICALHX_GEN_ALL_CORE_FT
PAST SURGICAL HISTORY:  Appendix disease s/p appendectomy    History of tonsillectomy and adenoidectomy

## 2022-07-25 NOTE — ED PROVIDER NOTE - PATIENT PORTAL LINK FT
You can access the FollowMyHealth Patient Portal offered by [facility] by registering at the following website: [email or web address]. By joining [employer]’s FollowMyHealth portal, you will also be able to view your health information using other applications (apps) compatible with our system.

## 2022-07-25 NOTE — ED ADULT TRIAGE NOTE - INTERNATIONAL TRAVEL
No Bcc Micronodular Histology Text: Multiple small aggregates of basaloid cells are present within the dermis. These small nodules display variable subtle peripheral palisading and retraction artifact.

## 2022-07-25 NOTE — ED ADULT TRIAGE NOTE - CHIEF COMPLAINT QUOTE
pt presents to ED with right arm and right hand stiffness, swelling, redness and pain. pt states she was bit by a bug earlier this afternoon and believes she has developed an infection from the bite. pt also reports symtpoms have gotten worse since time of event

## 2022-07-25 NOTE — ED ADULT NURSE NOTE - OBJECTIVE STATEMENT
Pt alert and oriented x3. Airway patent with equal respirations.No distress noted.Arom x 4 extremitites. Comfortable , no complaints voiced. Denies any chest pain, palpitations, dizziness, nausea, vomiting, visual disturbances. Denies HI,SI. Calm and collective. Pt safety precautions initiated.

## 2022-07-25 NOTE — ED PROVIDER NOTE - PHYSICAL EXAMINATION
Vital Signs: I have reviewed the initial vital signs.  Constitutional: well-nourished, appears stated age, no acute distress  Cardiovascular: regular rate, regular rhythm, well-perfused extremities  Respiratory: unlabored respiratory effort, clear to auscultation bilaterally  MUsculoskeletal: + mild redness, swelling pruritic rash noted to right dorsal hand and right elbow area; mild discomfort with flex/ext; pusles and sensation intact;

## 2022-11-21 ENCOUNTER — RX RENEWAL (OUTPATIENT)
Age: 32
End: 2022-11-21

## 2023-01-13 ENCOUNTER — APPOINTMENT (OUTPATIENT)
Dept: ORTHOPEDIC SURGERY | Facility: CLINIC | Age: 33
End: 2023-01-13
Payer: COMMERCIAL

## 2023-01-13 VITALS — BODY MASS INDEX: 24.54 KG/M2 | HEIGHT: 60 IN | WEIGHT: 125 LBS

## 2023-01-13 PROCEDURE — 73130 X-RAY EXAM OF HAND: CPT | Mod: RT

## 2023-01-13 PROCEDURE — 99202 OFFICE O/P NEW SF 15 MIN: CPT

## 2023-01-13 NOTE — HISTORY OF PRESENT ILLNESS
[de-identified] : 32-year-old female with a mass present dorsal aspect of her right hand.  Does not give her pain or discomfort.  She works as a pediatrician.  It does not give her any significant pain or discomfort.  Not interfere with function.  Been present for a while.  No injury

## 2023-01-13 NOTE — ASSESSMENT
[FreeTextEntry1] : Patient is a mass present on dorsal aspect of the right hand.  Not sure what it is.  Not the right location for a ganglion cyst it could be an anomalous muscle belly.  We will going to get an MRI of the right hand to evaluate for this mass.

## 2023-01-13 NOTE — DATA REVIEWED
[FreeTextEntry1] : Radiographs 3 views of the right hand reviewed showing no fracture dislocation no destructive lesions.

## 2023-01-13 NOTE — PHYSICAL EXAM
[de-identified] : Patient can flex  and extend the fingers well.  A palpable mass present dorsal aspect of the right hand.  No erythema ecchymoses or abrasions no instability the mass does not get bigger or smaller with dependency.  There is no erythema ecchymoses or abrasions.  Range of motion to the wrist.  The mass does not appear to move with the tendons.

## 2023-01-17 ENCOUNTER — APPOINTMENT (OUTPATIENT)
Dept: HUMAN REPRODUCTION | Facility: CLINIC | Age: 33
End: 2023-01-17
Payer: COMMERCIAL

## 2023-01-17 PROCEDURE — 99204 OFFICE O/P NEW MOD 45 MIN: CPT | Mod: 95

## 2023-01-19 ENCOUNTER — APPOINTMENT (OUTPATIENT)
Dept: MRI IMAGING | Facility: CLINIC | Age: 33
End: 2023-01-19
Payer: COMMERCIAL

## 2023-01-19 PROCEDURE — 73218 MRI UPPER EXTREMITY W/O DYE: CPT | Mod: RT

## 2023-01-31 ENCOUNTER — APPOINTMENT (OUTPATIENT)
Dept: ORTHOPEDIC SURGERY | Facility: CLINIC | Age: 33
End: 2023-01-31
Payer: COMMERCIAL

## 2023-01-31 VITALS — HEIGHT: 62 IN | WEIGHT: 125 LBS | BODY MASS INDEX: 23 KG/M2

## 2023-01-31 PROCEDURE — 99214 OFFICE O/P EST MOD 30 MIN: CPT

## 2023-01-31 NOTE — ASSESSMENT
[FreeTextEntry1] : Patient has a tenosynovitis at the extensor pollicis longus diagnosed on MRI tensional for tendon rupture was discussed with the patient she is contemplating the surgical intervention for this condition prevent tendon rub the surgery would be a rerouting of the extensor pollicis longus out of the groove at Raymond's tubercle.  Anti-inflammatories and thumb spica bracing were discussed.  I would not give her cortisone injection.  She will see me back as needed.  Postoperative care after rerouting of the tendon or tendon transfer was discussed

## 2023-01-31 NOTE — DATA REVIEWED
[FreeTextEntry1] : Her MRI is reviewed which does show abnormality around the extensor tendon around Raymond's tubercle.  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

## 2023-01-31 NOTE — HISTORY OF PRESENT ILLNESS
[de-identified] : 32-year-old female comes in the office for evaluation regarding her right hand.  She had an MRI done and an MRI documented abnormality around the extensor pollicis longus he does complain about some pain in the hand.  He thought is related to a mass that was present in her hand.  MRI just showed abnormality around the extensor pollicis longus.

## 2023-01-31 NOTE — PHYSICAL EXAM
[de-identified] : Patient does have some tenderness to palpation around Raymond's tubercle and distal to that along the extensor pollicis longus tendon the tendon is intact.  He can extend the thumb with power sometimes when she extends with power there is some pain and discomfort there is no swelling that is visible along the thumb area there is good range of motion and stable range of motion.

## 2023-02-18 NOTE — DISCHARGE NOTE PROVIDER - CARE PROVIDER_API CALL
Pt is alert and oriented x4, AVSS,,afebrile on RA.pt denies pain,nausea,and vomiting. Pt has numbness and tingling on bilateral lower extremity. Pt denies double vision overnight. Bladder scan was 43ml. Up with assist                          Keron Molina)  Surgery  [address] [place] [address], [address] [place] [address]  Phone: [phone]  Fax: [phone]  Follow Up Time:     Selena Baez)  Gastroenterology; Internal Medicine  [address] [place] [address]  Phone: [phone]  Fax: [phone]  Follow Up Time:

## 2023-03-01 ENCOUNTER — APPOINTMENT (OUTPATIENT)
Dept: HUMAN REPRODUCTION | Facility: CLINIC | Age: 33
End: 2023-03-01
Payer: COMMERCIAL

## 2023-03-01 PROCEDURE — 76830 TRANSVAGINAL US NON-OB: CPT

## 2023-03-01 PROCEDURE — 36415 COLL VENOUS BLD VENIPUNCTURE: CPT

## 2023-03-01 PROCEDURE — 99214 OFFICE O/P EST MOD 30 MIN: CPT | Mod: 25

## 2023-03-03 ENCOUNTER — APPOINTMENT (OUTPATIENT)
Dept: ORTHOPEDIC SURGERY | Facility: CLINIC | Age: 33
End: 2023-03-03

## 2023-05-10 ENCOUNTER — OUTPATIENT (OUTPATIENT)
Dept: OUTPATIENT SERVICES | Facility: HOSPITAL | Age: 33
LOS: 1 days | End: 2023-05-10
Payer: COMMERCIAL

## 2023-05-10 VITALS
RESPIRATION RATE: 16 BRPM | SYSTOLIC BLOOD PRESSURE: 103 MMHG | WEIGHT: 132.28 LBS | HEIGHT: 60 IN | DIASTOLIC BLOOD PRESSURE: 72 MMHG | HEART RATE: 75 BPM | TEMPERATURE: 96 F | OXYGEN SATURATION: 100 %

## 2023-05-10 DIAGNOSIS — Z87.59 PERSONAL HISTORY OF OTHER COMPLICATIONS OF PREGNANCY, CHILDBIRTH AND THE PUERPERIUM: Chronic | ICD-10-CM

## 2023-05-10 DIAGNOSIS — Z98.890 OTHER SPECIFIED POSTPROCEDURAL STATES: Chronic | ICD-10-CM

## 2023-05-10 DIAGNOSIS — M65.831 OTHER SYNOVITIS AND TENOSYNOVITIS, RIGHT FOREARM: ICD-10-CM

## 2023-05-10 DIAGNOSIS — K38.9 DISEASE OF APPENDIX, UNSPECIFIED: Chronic | ICD-10-CM

## 2023-05-10 DIAGNOSIS — Z01.818 ENCOUNTER FOR OTHER PREPROCEDURAL EXAMINATION: ICD-10-CM

## 2023-05-10 LAB
ALBUMIN SERPL ELPH-MCNC: 5 G/DL — SIGNIFICANT CHANGE UP (ref 3.5–5.2)
ALP SERPL-CCNC: 80 U/L — SIGNIFICANT CHANGE UP (ref 30–115)
ALT FLD-CCNC: 16 U/L — SIGNIFICANT CHANGE UP (ref 0–41)
ANION GAP SERPL CALC-SCNC: 14 MMOL/L — SIGNIFICANT CHANGE UP (ref 7–14)
AST SERPL-CCNC: 20 U/L — SIGNIFICANT CHANGE UP (ref 0–41)
BASOPHILS # BLD AUTO: 0.02 K/UL — SIGNIFICANT CHANGE UP (ref 0–0.2)
BASOPHILS NFR BLD AUTO: 0.3 % — SIGNIFICANT CHANGE UP (ref 0–1)
BILIRUB SERPL-MCNC: 0.5 MG/DL — SIGNIFICANT CHANGE UP (ref 0.2–1.2)
BUN SERPL-MCNC: 16 MG/DL — SIGNIFICANT CHANGE UP (ref 10–20)
CALCIUM SERPL-MCNC: 10.2 MG/DL — SIGNIFICANT CHANGE UP (ref 8.4–10.5)
CHLORIDE SERPL-SCNC: 105 MMOL/L — SIGNIFICANT CHANGE UP (ref 98–110)
CO2 SERPL-SCNC: 22 MMOL/L — SIGNIFICANT CHANGE UP (ref 17–32)
CREAT SERPL-MCNC: 0.8 MG/DL — SIGNIFICANT CHANGE UP (ref 0.7–1.5)
EGFR: 100 ML/MIN/1.73M2 — SIGNIFICANT CHANGE UP
EOSINOPHIL # BLD AUTO: 0.13 K/UL — SIGNIFICANT CHANGE UP (ref 0–0.7)
EOSINOPHIL NFR BLD AUTO: 1.7 % — SIGNIFICANT CHANGE UP (ref 0–8)
GLUCOSE SERPL-MCNC: 84 MG/DL — SIGNIFICANT CHANGE UP (ref 70–99)
HCT VFR BLD CALC: 42.5 % — SIGNIFICANT CHANGE UP (ref 37–47)
HGB BLD-MCNC: 13.8 G/DL — SIGNIFICANT CHANGE UP (ref 12–16)
IMM GRANULOCYTES NFR BLD AUTO: 0.4 % — HIGH (ref 0.1–0.3)
LYMPHOCYTES # BLD AUTO: 2.57 K/UL — SIGNIFICANT CHANGE UP (ref 1.2–3.4)
LYMPHOCYTES # BLD AUTO: 32.8 % — SIGNIFICANT CHANGE UP (ref 20.5–51.1)
MCHC RBC-ENTMCNC: 27.8 PG — SIGNIFICANT CHANGE UP (ref 27–31)
MCHC RBC-ENTMCNC: 32.5 G/DL — SIGNIFICANT CHANGE UP (ref 32–37)
MCV RBC AUTO: 85.5 FL — SIGNIFICANT CHANGE UP (ref 81–99)
MONOCYTES # BLD AUTO: 0.55 K/UL — SIGNIFICANT CHANGE UP (ref 0.1–0.6)
MONOCYTES NFR BLD AUTO: 7 % — SIGNIFICANT CHANGE UP (ref 1.7–9.3)
NEUTROPHILS # BLD AUTO: 4.54 K/UL — SIGNIFICANT CHANGE UP (ref 1.4–6.5)
NEUTROPHILS NFR BLD AUTO: 57.8 % — SIGNIFICANT CHANGE UP (ref 42.2–75.2)
NRBC # BLD: 0 /100 WBCS — SIGNIFICANT CHANGE UP (ref 0–0)
PLATELET # BLD AUTO: 275 K/UL — SIGNIFICANT CHANGE UP (ref 130–400)
PMV BLD: 11.3 FL — HIGH (ref 7.4–10.4)
POTASSIUM SERPL-MCNC: 5 MMOL/L — SIGNIFICANT CHANGE UP (ref 3.5–5)
POTASSIUM SERPL-SCNC: 5 MMOL/L — SIGNIFICANT CHANGE UP (ref 3.5–5)
PROT SERPL-MCNC: 7.2 G/DL — SIGNIFICANT CHANGE UP (ref 6–8)
RBC # BLD: 4.97 M/UL — SIGNIFICANT CHANGE UP (ref 4.2–5.4)
RBC # FLD: 12.9 % — SIGNIFICANT CHANGE UP (ref 11.5–14.5)
SODIUM SERPL-SCNC: 141 MMOL/L — SIGNIFICANT CHANGE UP (ref 135–146)
WBC # BLD: 7.84 K/UL — SIGNIFICANT CHANGE UP (ref 4.8–10.8)
WBC # FLD AUTO: 7.84 K/UL — SIGNIFICANT CHANGE UP (ref 4.8–10.8)

## 2023-05-10 PROCEDURE — 99214 OFFICE O/P EST MOD 30 MIN: CPT | Mod: 25

## 2023-05-10 PROCEDURE — 85025 COMPLETE CBC W/AUTO DIFF WBC: CPT

## 2023-05-10 PROCEDURE — 93010 ELECTROCARDIOGRAM REPORT: CPT

## 2023-05-10 PROCEDURE — 93005 ELECTROCARDIOGRAM TRACING: CPT

## 2023-05-10 PROCEDURE — 36415 COLL VENOUS BLD VENIPUNCTURE: CPT

## 2023-05-10 PROCEDURE — 80053 COMPREHEN METABOLIC PANEL: CPT

## 2023-05-10 NOTE — H&P PST ADULT - NSICDXPASTSURGICALHX_GEN_ALL_CORE_FT
PAST SURGICAL HISTORY:  Appendix disease s/p appendectomy    History of miscarriage     History of tonsillectomy and adenoidectomy

## 2023-05-10 NOTE — H&P PST ADULT - NSICDXPASTMEDICALHX_GEN_ALL_CORE_FT
PAST MEDICAL HISTORY:  H/O pre-eclampsia     Hearing aid worn     HTN (hypertension)     Hypothyroidism      PAST MEDICAL HISTORY:  Asthma, stable     H/O pre-eclampsia     Hearing aid worn     HTN (hypertension)     Hypothyroidism

## 2023-05-10 NOTE — H&P PST ADULT - HISTORY OF PRESENT ILLNESS
Patient is a  year old  female presenting to PAST in preparation for RIGHT HAND EXTENSOR POLLICIS LONGUS TENOSYNOVECTOMY  on 5/31/23  under LSB anesthesia by Dr. Gutierrez .  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  YES--Prior Anesthesia Complication-NAUSEA  NO--Latex Allergy  NO--Loose teeth  NO--History of Rheumatoid Arthritis  NO--Bleeding risk  NO--ALFRED  NO--Other_____    PT DENIES ANY RASHES, ABRASION, OR OPEN WOUNDS OR CUTS    AS PER THE PT, THIS IS HIS/HER COMPLETE MEDICAL AND SURGICAL HX, INCLUDING MEDICATIONS PRESCRIBED AND OVER THE COUNTER    Patient verbalized understanding of instructions and was given the opportunity to ask questions and have them answered.    pt denies any suicidal ideation or thoughts, pt states not a threat to self or others

## 2023-05-11 DIAGNOSIS — M65.831 OTHER SYNOVITIS AND TENOSYNOVITIS, RIGHT FOREARM: ICD-10-CM

## 2023-05-11 DIAGNOSIS — Z01.818 ENCOUNTER FOR OTHER PREPROCEDURAL EXAMINATION: ICD-10-CM

## 2023-05-15 ENCOUNTER — APPOINTMENT (OUTPATIENT)
Dept: ORTHOPEDIC SURGERY | Facility: CLINIC | Age: 33
End: 2023-05-15
Payer: COMMERCIAL

## 2023-05-15 VITALS — WEIGHT: 125 LBS | BODY MASS INDEX: 23 KG/M2 | HEIGHT: 62 IN

## 2023-05-15 PROCEDURE — 99213 OFFICE O/P EST LOW 20 MIN: CPT

## 2023-05-15 NOTE — ASSESSMENT
[FreeTextEntry1] : Patient has right EPL extensor tenosynovitis.  The patient requires EPL tenosynovectomy with more than likely transposition of the EPL.  She will see me back at the time of surgical procedure.  We will evaluate the a anomalous muscle muscle belly as well the surgery be done the local with sedation.  To work activities was discussed.

## 2023-05-15 NOTE — HISTORY OF PRESENT ILLNESS
[de-identified] : 32-year-old female with pain discomfort across the right wrist she had an MRI that documented abnormality around the extensor pollicis longus.  She comes in for the evaluation today.

## 2023-05-15 NOTE — PHYSICAL EXAM
[de-identified] : Patient has pain along Raymond's tubercle.  Has pain along the length of the EPL in the hand.  EPL is intact.  Straightens and flexes the fingers it does appear to be an anomalous muscle belly associated with the extensor tendons.

## 2023-05-30 NOTE — ASU PATIENT PROFILE, ADULT - NSICDXPASTMEDICALHX_GEN_ALL_CORE_FT
PAST MEDICAL HISTORY:  Asthma, stable     H/O pre-eclampsia     Hearing aid worn     HTN (hypertension)     Hypothyroidism

## 2023-05-30 NOTE — ASU PATIENT PROFILE, ADULT - NS PRO LAST MENSTRUAL
Patient with history of multiple cancers, most recently for right lung adenocarcinoma with metastatic disease. Most recent PET concerning for progression of disease with increased metabolic activity in multiple nodules in bilateral lungs. Patient's lab abnormalities have kept her Tarceva on home, plan was to transition to Afatinib if patient could not tolerate Tarceva.    Receives outpatient fluid and electrolyte infusions at oncology infusion center   5/16/23

## 2023-05-31 ENCOUNTER — APPOINTMENT (OUTPATIENT)
Dept: ORTHOPEDIC SURGERY | Facility: AMBULATORY SURGERY CENTER | Age: 33
End: 2023-05-31

## 2023-05-31 ENCOUNTER — TRANSCRIPTION ENCOUNTER (OUTPATIENT)
Age: 33
End: 2023-05-31

## 2023-05-31 ENCOUNTER — OUTPATIENT (OUTPATIENT)
Dept: INPATIENT UNIT | Facility: HOSPITAL | Age: 33
LOS: 1 days | Discharge: HOME | End: 2023-05-31
Payer: COMMERCIAL

## 2023-05-31 ENCOUNTER — RESULT REVIEW (OUTPATIENT)
Age: 33
End: 2023-05-31

## 2023-05-31 VITALS
SYSTOLIC BLOOD PRESSURE: 117 MMHG | RESPIRATION RATE: 16 BRPM | WEIGHT: 132.28 LBS | TEMPERATURE: 98 F | HEART RATE: 75 BPM | DIASTOLIC BLOOD PRESSURE: 61 MMHG | OXYGEN SATURATION: 100 % | HEIGHT: 60 IN

## 2023-05-31 VITALS
DIASTOLIC BLOOD PRESSURE: 79 MMHG | SYSTOLIC BLOOD PRESSURE: 126 MMHG | HEART RATE: 60 BPM | RESPIRATION RATE: 14 BRPM | OXYGEN SATURATION: 100 % | TEMPERATURE: 98 F

## 2023-05-31 DIAGNOSIS — Z98.890 OTHER SPECIFIED POSTPROCEDURAL STATES: Chronic | ICD-10-CM

## 2023-05-31 DIAGNOSIS — M65.831 OTHER SYNOVITIS AND TENOSYNOVITIS, RIGHT FOREARM: ICD-10-CM

## 2023-05-31 DIAGNOSIS — Z87.59 PERSONAL HISTORY OF OTHER COMPLICATIONS OF PREGNANCY, CHILDBIRTH AND THE PUERPERIUM: Chronic | ICD-10-CM

## 2023-05-31 DIAGNOSIS — K38.9 DISEASE OF APPENDIX, UNSPECIFIED: Chronic | ICD-10-CM

## 2023-05-31 PROCEDURE — 88304 TISSUE EXAM BY PATHOLOGIST: CPT | Mod: 26

## 2023-05-31 PROCEDURE — 25000 INCISION OF TENDON SHEATH: CPT | Mod: RT,59

## 2023-05-31 PROCEDURE — 26111 EXC HAND LES SC 1.5 CM/>: CPT | Mod: RT

## 2023-05-31 PROCEDURE — 88304 TISSUE EXAM BY PATHOLOGIST: CPT

## 2023-05-31 PROCEDURE — 25109 EXCISE TENDON FOREARM/WRIST: CPT | Mod: RT

## 2023-05-31 RX ORDER — HYDROMORPHONE HYDROCHLORIDE 2 MG/ML
0.25 INJECTION INTRAMUSCULAR; INTRAVENOUS; SUBCUTANEOUS
Refills: 0 | Status: DISCONTINUED | OUTPATIENT
Start: 2023-05-31 | End: 2023-05-31

## 2023-05-31 RX ORDER — LABETALOL HCL 100 MG
1 TABLET ORAL
Refills: 0 | DISCHARGE

## 2023-05-31 RX ORDER — LEVOTHYROXINE SODIUM 125 MCG
1 TABLET ORAL
Refills: 0 | DISCHARGE

## 2023-05-31 RX ORDER — IBUPROFEN 200 MG
1 TABLET ORAL
Qty: 20 | Refills: 0
Start: 2023-05-31

## 2023-05-31 RX ORDER — ONDANSETRON 8 MG/1
4 TABLET, FILM COATED ORAL ONCE
Refills: 0 | Status: DISCONTINUED | OUTPATIENT
Start: 2023-05-31 | End: 2023-05-31

## 2023-05-31 RX ORDER — SODIUM CHLORIDE 9 MG/ML
1000 INJECTION, SOLUTION INTRAVENOUS
Refills: 0 | Status: DISCONTINUED | OUTPATIENT
Start: 2023-05-31 | End: 2023-05-31

## 2023-05-31 RX ORDER — OXYCODONE HYDROCHLORIDE 5 MG/1
5 TABLET ORAL ONCE
Refills: 0 | Status: DISCONTINUED | OUTPATIENT
Start: 2023-05-31 | End: 2023-05-31

## 2023-05-31 RX ADMIN — HYDROMORPHONE HYDROCHLORIDE 0.25 MILLIGRAM(S): 2 INJECTION INTRAMUSCULAR; INTRAVENOUS; SUBCUTANEOUS at 08:40

## 2023-05-31 RX ADMIN — SODIUM CHLORIDE 100 MILLILITER(S): 9 INJECTION, SOLUTION INTRAVENOUS at 08:40

## 2023-05-31 NOTE — CHART NOTE - NSCHARTNOTEFT_GEN_A_CORE
PACU ANESTHESIA ADMISSION NOTE      Procedure: right wrist extensor pollicus longus tenosynovectomy  Post op diagnosis:  right wrist extensor tenosynovitis    ____  Intubated  TV:______       Rate: ______      FiO2: ______    __x__  Patent Airway    __x__  Full return of protective reflexes    __x__  Full recovery from anesthesia / back to baseline status    Vitals  HR: 81  BP: 112/69  RR: 12  O2 Sat: 100  Temp: 97.6    Mental Status:  __x__ Awake   ___x__ Alert   _____ Drowsy   _____ Sedated    Nausea/Vomiting:  __x__ NO  ______Yes,   See Post - Op Orders          Pain Scale (0-10):  _____    Treatment: ____ None    __x__ See Post - Op/PCA Orders    Post - Operative Fluids:   ____ Oral   __x__ See Post - Op Orders    Plan: Discharge when criteria met:   _x___Home       _____Floor     _____Critical Care   Other:_________________    Comments: Patient had smooth intraoperative event, no anesthesia complication.

## 2023-05-31 NOTE — BRIEF OPERATIVE NOTE - NSICDXBRIEFPROCEDURE_GEN_ALL_CORE_FT
PROCEDURES:  Incision, tendon sheath, extensor 31-May-2023 09:02:01  Andi Gutierrez   PROCEDURES:  Incision, tendon sheath, extensor 31-May-2023 09:02:01  Andi Gutierrez  Excision, subcutaneous tumor, finger, 1.5 cm or greater 31-May-2023 13:01:59  Andi Gutierrez

## 2023-05-31 NOTE — ASU DISCHARGE PLAN (ADULT/PEDIATRIC) - NS MD DC FALL RISK RISK
For information on Fall & Injury Prevention, visit: [email or web address] OR  [email or web address] OR  https://www.cdc.gov/steadi/patient.html

## 2023-05-31 NOTE — PRE-ANESTHESIA EVALUATION ADULT - NSANTHPMHFT_GEN_ALL_CORE
METS>4 without CP/palpitations/sob  Denies orthopnea  well controlled asthma - rare use of inhaler, only after COVID  severe PONV after anesthetics. H/o motion sickness. tolerates oxycodone. Does not tolerate morphine.

## 2023-05-31 NOTE — BRIEF OPERATIVE NOTE - NSICDXBRIEFPOSTOP_GEN_ALL_CORE_FT
POST-OP DIAGNOSIS:  Mass of right hand 31-May-2023 13:03:34  Andi Gutierrez  Extensor pollicis longus tendinitis 31-May-2023 13:03:50  Andi Gutierrez

## 2023-05-31 NOTE — ASU DISCHARGE PLAN (ADULT/PEDIATRIC) - ASU DC SPECIAL INSTRUCTIONSFT

## 2023-05-31 NOTE — BRIEF OPERATIVE NOTE - NSICDXBRIEFPREOP_GEN_ALL_CORE_FT
PRE-OP DIAGNOSIS:  Mass of hand, right 31-May-2023 13:02:34  Andi Gutierrez  Extensor pollicis longus tendinitis 31-May-2023 13:02:46  Andi Gutierrez

## 2023-06-02 LAB — SURGICAL PATHOLOGY STUDY: SIGNIFICANT CHANGE UP

## 2023-06-05 DIAGNOSIS — E03.9 HYPOTHYROIDISM, UNSPECIFIED: ICD-10-CM

## 2023-06-05 DIAGNOSIS — M77.8 OTHER ENTHESOPATHIES, NOT ELSEWHERE CLASSIFIED: ICD-10-CM

## 2023-06-05 DIAGNOSIS — J45.909 UNSPECIFIED ASTHMA, UNCOMPLICATED: ICD-10-CM

## 2023-06-05 DIAGNOSIS — R22.31 LOCALIZED SWELLING, MASS AND LUMP, RIGHT UPPER LIMB: ICD-10-CM

## 2023-06-05 DIAGNOSIS — I10 ESSENTIAL (PRIMARY) HYPERTENSION: ICD-10-CM

## 2023-06-06 PROBLEM — E03.9 HYPOTHYROIDISM, UNSPECIFIED: Chronic | Status: ACTIVE | Noted: 2023-05-10

## 2023-06-06 PROBLEM — I10 ESSENTIAL (PRIMARY) HYPERTENSION: Chronic | Status: ACTIVE | Noted: 2023-05-10

## 2023-06-06 PROBLEM — J45.909 UNSPECIFIED ASTHMA, UNCOMPLICATED: Chronic | Status: ACTIVE | Noted: 2023-05-10

## 2023-06-06 PROBLEM — Z87.59 PERSONAL HISTORY OF OTHER COMPLICATIONS OF PREGNANCY, CHILDBIRTH AND THE PUERPERIUM: Chronic | Status: ACTIVE | Noted: 2023-05-10

## 2023-06-12 ENCOUNTER — APPOINTMENT (OUTPATIENT)
Dept: ORTHOPEDIC SURGERY | Facility: CLINIC | Age: 33
End: 2023-06-12
Payer: COMMERCIAL

## 2023-06-12 DIAGNOSIS — R22.31 LOCALIZED SWELLING, MASS AND LUMP, RIGHT UPPER LIMB: ICD-10-CM

## 2023-06-12 DIAGNOSIS — M65.831 OTHER SYNOVITIS AND TENOSYNOVITIS, RIGHT FOREARM: ICD-10-CM

## 2023-06-12 PROCEDURE — 99024 POSTOP FOLLOW-UP VISIT: CPT

## 2023-06-12 NOTE — DISCUSSION/SUMMARY
[de-identified] : At this time the knots on the ends of the sutures were removed, the rest of the sutures are absorbable.\par She is going to work on her range of motion, she would like to continue doing this on her own. \par We will see her back as needed.\par She will call if any other problems or concerns.  She verbalized understanding and agreed with the plan, all questions were answered in the office today.\par

## 2023-06-12 NOTE — HISTORY OF PRESENT ILLNESS
[de-identified] : 32-year-old female is here today for her first postop appointment that is post right EPL tendon sheath release and mass excision on 5/31/2023 with Dr. Gutierrez.  She is doing well. 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

## 2023-06-12 NOTE — PHYSICAL EXAM
[de-identified] : On examination of her right hand she has minimal swelling, no ecchymosis.  The incision is healing well, no surrounding erythema or drainage from the wound.  Absorbable sutures were used.  She is able to open and close her fist, she can flex and extend the wrist, she can flex and extend the thumb.  Mild stiffness.  Sensation is intact throughout, 2+ radial pulse.  \par Dr. Gutierrez came in and evaluated the patient as well.\par \par \par

## 2023-06-21 ENCOUNTER — APPOINTMENT (OUTPATIENT)
Dept: HUMAN REPRODUCTION | Facility: CLINIC | Age: 33
End: 2023-06-21
Payer: COMMERCIAL

## 2023-06-21 PROCEDURE — 99214 OFFICE O/P EST MOD 30 MIN: CPT | Mod: 95

## 2023-06-29 ENCOUNTER — APPOINTMENT (OUTPATIENT)
Dept: HUMAN REPRODUCTION | Facility: CLINIC | Age: 33
End: 2023-06-29

## 2023-07-19 ENCOUNTER — APPOINTMENT (OUTPATIENT)
Dept: HUMAN REPRODUCTION | Facility: CLINIC | Age: 33
End: 2023-07-19

## 2023-10-27 ENCOUNTER — EMERGENCY (EMERGENCY)
Facility: HOSPITAL | Age: 33
LOS: 0 days | Discharge: HOME | End: 2023-10-27
Attending: EMERGENCY MEDICINE
Payer: COMMERCIAL

## 2023-10-27 VITALS
HEART RATE: 83 BPM | RESPIRATION RATE: 19 BRPM | DIASTOLIC BLOOD PRESSURE: 77 MMHG | WEIGHT: 132.06 LBS | OXYGEN SATURATION: 97 % | HEIGHT: 60 IN | TEMPERATURE: 98 F | SYSTOLIC BLOOD PRESSURE: 136 MMHG
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
## 2023-10-27 DIAGNOSIS — Z98.890 OTHER SPECIFIED POSTPROCEDURAL STATES: Chronic | ICD-10-CM

## 2023-10-27 DIAGNOSIS — E03.9 HYPOTHYROIDISM, UNSPECIFIED: ICD-10-CM

## 2023-10-27 DIAGNOSIS — Z88.8 ALLERGY STATUS TO OTHER DRUGS, MEDICAMENTS AND BIOLOGICAL SUBSTANCES: ICD-10-CM

## 2023-10-27 DIAGNOSIS — I10 ESSENTIAL (PRIMARY) HYPERTENSION: ICD-10-CM
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
## 2023-10-27 DIAGNOSIS — Z90.49 ACQUIRED ABSENCE OF OTHER SPECIFIED PARTS OF DIGESTIVE TRACT: ICD-10-CM

## 2023-10-27 DIAGNOSIS — T78.1XXA OTHER ADVERSE FOOD REACTIONS, NOT ELSEWHERE CLASSIFIED, INITIAL ENCOUNTER: ICD-10-CM

## 2023-10-27 DIAGNOSIS — J45.909 UNSPECIFIED ASTHMA, UNCOMPLICATED: ICD-10-CM

## 2023-10-27 DIAGNOSIS — K38.9 DISEASE OF APPENDIX, UNSPECIFIED: Chronic | ICD-10-CM

## 2023-10-27 DIAGNOSIS — Z97.4 PRESENCE OF EXTERNAL HEARING-AID: ICD-10-CM

## 2023-10-27 DIAGNOSIS — R21 RASH AND OTHER NONSPECIFIC SKIN ERUPTION: ICD-10-CM

## 2023-10-27 DIAGNOSIS — Z90.09 ACQUIRED ABSENCE OF OTHER PART OF HEAD AND NECK: ICD-10-CM

## 2023-10-27 DIAGNOSIS — Z87.59 PERSONAL HISTORY OF OTHER COMPLICATIONS OF PREGNANCY, CHILDBIRTH AND THE PUERPERIUM: Chronic | ICD-10-CM

## 2023-10-27 PROCEDURE — 99284 EMERGENCY DEPT VISIT MOD MDM: CPT

## 2023-10-27 PROCEDURE — 99283 EMERGENCY DEPT VISIT LOW MDM: CPT

## 2023-10-27 RX ORDER — DEXAMETHASONE 0.5 MG/5ML
10 ELIXIR ORAL ONCE
Refills: 0 | Status: COMPLETED | OUTPATIENT
Start: 2023-10-27 | End: 2023-10-27

## 2023-10-27 RX ORDER — FAMOTIDINE 10 MG/ML
20 INJECTION INTRAVENOUS DAILY
Refills: 0 | Status: DISCONTINUED | OUTPATIENT
Start: 2023-10-27 | End: 2023-10-27

## 2023-10-27 RX ORDER — EPINEPHRINE 0.3 MG/.3ML
0.3 INJECTION INTRAMUSCULAR; SUBCUTANEOUS
Qty: 1 | Refills: 0
Start: 2023-10-27 | End: 2023-10-27

## 2023-10-27 RX ADMIN — FAMOTIDINE 20 MILLIGRAM(S): 10 INJECTION INTRAVENOUS at 06:22

## 2023-10-27 RX ADMIN — Medication 10 MILLIGRAM(S): at 06:22

## 2023-10-27 NOTE — ED PROVIDER NOTE - PHYSICAL EXAMINATION
GENERAL: Well-developed; well-nourished; in no acute distress.   SKIN: warm, dry, scattered areas of erythema  HEAD: Normocephalic; atraumatic.  EYES: PERRLA, EOMI, no conjunctival erythema  ENT: No nasal discharge; airway clear.  NECK: Supple; non tender.  CARD: S1, S2 normal; no murmurs, gallops, or rubs. Regular rate and rhythm.   RESP: LCTAB; No wheezes, rales, rhonchi, or stridor.  ABD: soft, nontender, and nondistended  EXT: Normal ROM.  No LE TTP or edema bilaterally.  NEURO: Alert, oriented, grossly unremarkable  PSYCH: Cooperative, appropriate.

## 2023-10-27 NOTE — ED PROVIDER NOTE - ATTENDING CONTRIBUTION TO CARE
33-year-old female with PMH HTN, hypothyroidism, asthma presents for evaluation of pruritic rash that occurred after eating seafood.  Patient states she ate stingray and  snails at home that they bought at the local  market.  Patient states several hours after eating she developed a pruritic rash.  Denies any throat tightness, nausea, vomiting, diarrhea, abdominal pain or headache.  No shortness of breath, cough or wheezing.  Patient denies any known food allergies in past; states she has anaphylactic allergies to Benadryl.    VITAL SIGNS: noted  CONSTITUTIONAL: Well-developed; well-nourished; in no acute distress  HEAD: Normocephalic; atraumatic  EYES: PERRL, EOM intact; conjunctiva and sclera clear  ENT: No nasal discharge; airway clear. MMM, Oropharynx normal, no tongue swelling or facial swelling, phonating normally  NECK: Supple; non tender. No anterior cervical lymphadenopathy noted  CARD: S1, S2 normal; no murmurs, gallops, or rubs. Regular rate and rhythm  RESP: CTAB/L, no wheezes, rales or rhonchi  ABD: Normal bowel sounds; soft; non-distended; non-tender  EXT: Normal ROM. No calf tenderness or edema. Distal pulses intact  NEURO: Alert, oriented. Grossly unremarkable. No focal deficits  SKIN: Skin exam is warm and dry,  1 small area urticaria noted to arm, patient reported she had scattered areas along trunk and arms which resolves with some associated pruritus, no cellulitis, no vesicles

## 2023-10-27 NOTE — ED PROVIDER NOTE - CLINICAL SUMMARY MEDICAL DECISION MAKING FREE TEXT BOX
Patient evaluated for allergic symptoms after eating snails and stingray, symptoms improved in ED with meds.  Patient not given Benadryl due to allergy.  Reported symptomatic improvement.  EpiPen prescribed.  Patient advised to follow-up closely with PMD for reevaluation and agreed.  Strict return precautions advised and patient verbalized understanding.

## 2023-10-27 NOTE — ED ADULT NURSE NOTE - CHIEF COMPLAINT QUOTE
" I think I have allergic reaction with the food (stingray & snails) I ate last night, I woke up having rashes & itching all over & a scratchy throat."

## 2023-10-27 NOTE — ED ADULT NURSE NOTE - NSFALLUNIVINTERV_ED_ALL_ED
Bed/Stretcher in lowest position, wheels locked, appropriate side rails in place/Call bell, personal items and telephone in reach/Instruct patient to call for assistance before getting out of bed/chair/stretcher/Non-slip footwear applied when patient is off stretcher/[place] to call system/Physically safe environment - no spills, clutter or unnecessary equipment/Purposeful proactive rounding/Room/bathroom lighting operational, light cord in reach

## 2023-10-27 NOTE — ED ADULT NURSE NOTE - DISCHARGE DATE/TIME
Patient : Shyam Pickens Age: 32 year old Sex: male   MRN: [id number] Encounter Date: 6/19/2021      History     Chief Complaint   Patient presents with   • Psychiatric Problem     Patient c/o increased thoughts of self harm hx of SI attemps. Patient denies HI.     HPI    32-year-old male past medical history significant for hypertension, seizure disorder, bipolar disorder, and history of ecstasy use (last use 1 month prior) as well as history of SI presents to [facility] ED for suicidal ideation.  Patient reports over the past 2 days has been feeling low other than when he is sleeping.  He states that he wants to sleep forever and his plan was to make someone either shoot or stab him.  Recognize these thoughts and is seeking medical help.  He states he is supposed to be on a number of medications however does not take them as prescribed as he feels he is being judged as someone who has psychiatric medical conditions.  He denies any recent alcohol use. Denies any ingestion of toxic substances or overdose. He states he is supposed to have a medical  following up with him however reports that he does not have appropriate support.     To note, last psychiatric admission 4/18-4/28 after he had ingested 10 tablets of ecstasy.    Allergies   Allergen Reactions   • Sulfa Antibiotics ANAPHYLAXIS   • Haldol Other (See Comments)     Mouth and tongue swelling   • Pork Allergy   (Food Or Med) Other (See Comments)     Per preference   • Sulfa Antibiotics RASH       Current Discharge Medication List      Prior to Admission Medications    Details   sertraline (ZOLOFT) 50 MG tablet Take 1 tablet by mouth daily.  Qty: 30 tablet, Refills: 0      carBAMazepine (TEGretol XR) 200 MG 12 hr tablet Take 1 tablet by mouth every 12 hours.  Qty: 60 tablet, Refills: 0      QUEtiapine (SEROquel) 50 MG tablet Take 3 tablets by mouth nightly.  Qty: 90 tablet, Refills: 0      ARIPiprazole (ABILIFY) 20 MG tablet Take 1 tablet by mouth daily. Do not  start before April 29, 2021.  Qty: 30 tablet, Refills: 0      ALPRAZolam (XANAX) 1 MG tablet Take 1 tablet by mouth daily as needed for Anxiety.  Qty: 14 tablet, Refills: 0      losartan (COZAAR) 50 MG tablet Take 50 mg by mouth daily.      hydrochlorothiazide (HYDRODIURIL) 25 MG tablet Take 25 mg by mouth daily.             Past Medical History:   Bipolar disorder   Hypertension  History of seizures   History of ecstasy use    No past surgical history on file.   Unknown    No family history on file.   Unknown    Social History     Tobacco Use   • Smoking status: Current Every Day Smoker     Packs/day: 1.00     Types: Cigarettes   Substance Use Topics   • Alcohol use: Never   • Drug use: Never       Review of Systems   Constitutional: Negative for chills and fever.   Respiratory: Negative for apnea, cough, choking, shortness of breath, wheezing and stridor.    Cardiovascular: Negative for chest pain, palpitations and leg swelling.   Gastrointestinal: Negative for abdominal pain, diarrhea, nausea and vomiting.   Psychiatric/Behavioral: Positive for self-injury and suicidal ideas. Negative for agitation, confusion, dysphoric mood and hallucinations. The patient is not nervous/anxious.        Physical Exam     ED Triage Vitals [06/19/21 1644]   ED Triage Vitals Group      Temp 96.8 °F (36 °C)      Heart Rate 92      Resp 18      /80      SpO2 98 %      EtCO2 mmHg       Height 6' (1.829 m)      Weight (!) 440 lb (199.6 kg)      Weight Scale Used Standing scale      BMI (Calculated) 59.67      IBW/kg (Calculated) 77.6       Physical Exam  Vitals and nursing note reviewed.   Constitutional:       General: He is not in acute distress.     Appearance: He is obese. He is not ill-appearing, toxic-appearing or diaphoretic.   HENT:      Head: Normocephalic.   Eyes:      General: No scleral icterus.     Extraocular Movements: Extraocular movements intact.      Pupils: Pupils are equal, round, and reactive to light.    Cardiovascular:      Rate and Rhythm: Normal rate and regular rhythm.      Pulses: Normal pulses.   Pulmonary:      Effort: Pulmonary effort is normal. No respiratory distress.      Breath sounds: No stridor. No wheezing, rhonchi or rales.   Abdominal:      General: There is no distension.      Palpations: Abdomen is soft. There is no mass.      Tenderness: There is no abdominal tenderness.   Musculoskeletal:      Right lower leg: No edema.      Left lower leg: No edema.   Skin:     General: Skin is warm and dry.   Neurological:      General: No focal deficit present.      Mental Status: He is alert and oriented to person, place, and time.   Psychiatric:         Attention and Perception: Attention and perception normal.         Speech: Speech normal.         Behavior: Behavior is cooperative.         Thought Content: Thought content includes suicidal ideation. Thought content includes suicidal plan.         Cognition and Memory: Cognition and memory normal.         ED Course     Procedures    Lab Results     Results for orders placed or performed during the hospital encounter of 06/19/21   Basic Metabolic Panel   Result Value Ref Range    Fasting Status      Sodium 139 135 - 145 mmol/L    Potassium 3.7 3.4 - 5.1 mmol/L    Chloride 107 98 - 107 mmol/L    Carbon Dioxide 29 21 - 32 mmol/L    Anion Gap 7 (L) 10 - 20 mmol/L    Glucose 118 (H) 65 - 99 mg/dL    BUN 10 6 - 20 mg/dL    Creatinine 0.78 0.67 - 1.17 mg/dL    Glomerular Filtration Rate >90 >90 mL/min/1.73m2    BUN/ Creatinine Ratio 13 7 - 25    Calcium 8.6 8.4 - 10.2 mg/dL   Acetaminophen Level   Result Value Ref Range    Acetaminophen <2 (L) 10 - 30 mcg/mL   Salicylate Level   Result Value Ref Range    Salicylate <2.8 <=30.0 mg/dL   CBC with Automated Differential (performable only)   Result Value Ref Range    WBC 10.7 4.2 - 11.0 K/mcL    RBC 5.10 4.50 - 5.90 mil/mcL    HGB 13.0 13.0 - 17.0 g/dL    HCT 38.0 (L) 39.0 - 51.0 %    MCV 74.5 (L) 78.0 - 100.0 fl     MCH 25.5 (L) 26.0 - 34.0 pg    MCHC 34.2 32.0 - 36.5 g/dL    RDW-CV 15.9 (H) 11.0 - 15.0 %    RDW-SD 42.5 39.0 - 50.0 fL     140 - 450 K/mcL    NRBC 0 <=0 /100 WBC    Neutrophil, Percent 59 %    Lymphocytes, Percent 26 %    Mono, Percent 8 %    Eosinophils, Percent 5 %    Basophils, Percent 1 %    Immature Granulocytes 1 %    Absolute Neutrophils 6.4 1.8 - 7.7 K/mcL    Absolute Lymphocytes 2.7 1.0 - 4.8 K/mcL    Absolute Monocytes 0.9 0.3 - 0.9 K/mcL    Absolute Eosinophils  0.5 0.0 - 0.5 K/mcL    Absolute Basophils 0.1 0.0 - 0.3 K/mcL    Absolute Immmature Granulocytes 0.1 0.0 - 0.2 K/mcL   Drug Abuse Screen, Urine   Result Value Ref Range    Amphetamines, Urine Negative Negative    Barbiturates, Urine Negative Negative    Benzodiazepines, Urine Positive (A) Negative    Cocaine/ Metabolite, Urine Negative Negative    Opiates, Urine Negative Negative    Phencyclidine, Urine Negative Negative    Cannabinoids, Urine Negative Negative   Rapid SARS-CoV-2 by PCR    Specimen: Nasopharyngeal; Swab   Result Value Ref Range    Rapid SARS-COV-2 by PCR Not Detected Not Detected / Detected / Presumptive Positive / Inhibitors present    Isolation Guidelines      Procedural Comment         EKG Results     EKG Interpretation  Rate: 105  Rhythm: sinus tachycardia   Abnormality: no    EKG tracing interpreted by ED physician    Radiology Results     Imaging Results    None         ED Medication Orders (From admission, onward)    Ordered Start     Status Ordering Provider    06/19/21 1739 06/19/21 4325  LORazepam (ATIVAN) injection 1 mg  ONCE      Last MAR action: Given THEO CLEMONS               [facility]  Number of Diagnoses or Management Options  Suicidal ideation  Diagnosis management comments: 32-year-old male past medical history significant for hypertension, seizure disorder, bipolar disorder, and history of ecstasy use (last use 1 month prior) as well as history of SI presents to [facility] ED for suicidal ideation.  Patient  verbalizing active plan for suicide.  Routine blood work unremarkable.  EKG demonstrating sinus rhythm.  Patient medically optimized for admission for central access.      Clinical Impression     ED Diagnosis   1. Suicidal ideation         Disposition        There is no disposition no dispo time  There is no comment    Dicussed with Attending. Please see Attending documentation for final recommendations.     Patricia Cintron DO   Internal Medicine, PGY-3                 Patricia Cintron DO  Resident  06/19/21 2036       Patricia Cintron DO  Resident  06/19/21 2037     27-Oct-2023 06:50

## 2023-10-27 NOTE — ED ADULT NURSE NOTE - FINAL NURSING ELECTRONIC SIGNATURE
27-Oct-2023 05:52

## 2023-10-27 NOTE — ED ADULT NURSE NOTE - OBJECTIVE STATEMENT
Pt reports skin  rashes all over body , skin  itchiness and scratchy throat . Pt reports first time eating snails & stingray last night . Airway patent , speak in full sentences, no SOB noted , no swelling of face, tongue noted .

## 2023-10-27 NOTE — ED PROVIDER NOTE - OBJECTIVE STATEMENT
33-year-old female, with past medical history of HTN, asthma, hypothyroid, presents to the emergency department with itching rash after eating snails stingray for the first time last night.  Denies shortness of breath, wheezing, tongue swelling, nausea, vomiting, diarrhea, abdominal pain.  Denies known food allergies.  States she has an anaphylactic reaction to Benadryl.

## 2023-10-27 NOTE — ED ADULT TRIAGE NOTE - CHIEF COMPLAINT QUOTE
" I think I have allergic reaction to the stingray & snails I ate last night, I woke up having rashes & itching all over & a scratchy throat." " I think I have allergic reaction with the food (stingray & snails) I ate last night, I woke up having rashes & itching all over & a scratchy throat."

## 2023-10-27 NOTE — ED ADULT TRIAGE NOTE - WEIGHT METHOD
I have personally seen and examined this patient.  I have fully participated in the care of this patient. I have reviewed all pertinent clinical information, including history, physical exam, plan and the Resident’s note and agree except as noted. stated [id number] Detailed

## 2023-10-27 NOTE — ED PROVIDER NOTE - PATIENT PORTAL LINK FT
You can access the FollowMyHealth Patient Portal offered by [facility] by registering at the following website: [email or web address]. By joining [employer]’s FollowMyHealth portal, you will also be able to view your health information using other applications (apps) compatible with our system.

## 2023-11-21 ENCOUNTER — APPOINTMENT (OUTPATIENT)
Dept: HUMAN REPRODUCTION | Facility: CLINIC | Age: 33
End: 2023-11-21
Payer: COMMERCIAL

## 2023-11-21 PROCEDURE — 99214 OFFICE O/P EST MOD 30 MIN: CPT | Mod: 95

## 2024-01-02 ENCOUNTER — APPOINTMENT (OUTPATIENT)
Dept: HUMAN REPRODUCTION | Facility: CLINIC | Age: 34
End: 2024-01-02
Payer: COMMERCIAL

## 2024-01-02 PROCEDURE — 84702 CHORIONIC GONADOTROPIN TEST: CPT

## 2024-01-02 PROCEDURE — 82670 ASSAY OF TOTAL ESTRADIOL: CPT

## 2024-01-02 PROCEDURE — 36415 COLL VENOUS BLD VENIPUNCTURE: CPT

## 2024-01-02 PROCEDURE — 76830 TRANSVAGINAL US NON-OB: CPT

## 2024-01-02 PROCEDURE — 83002 ASSAY OF GONADOTROPIN (LH): CPT | Mod: QW

## 2024-01-02 PROCEDURE — 84144 ASSAY OF PROGESTERONE: CPT

## 2024-01-02 PROCEDURE — 99213 OFFICE O/P EST LOW 20 MIN: CPT | Mod: 25

## 2024-01-05 ENCOUNTER — APPOINTMENT (OUTPATIENT)
Dept: HUMAN REPRODUCTION | Facility: CLINIC | Age: 34
End: 2024-01-05

## 2024-01-07 ENCOUNTER — APPOINTMENT (OUTPATIENT)
Dept: HUMAN REPRODUCTION | Facility: CLINIC | Age: 34
End: 2024-01-07
Payer: COMMERCIAL

## 2024-01-07 PROCEDURE — 76857 US EXAM PELVIC LIMITED: CPT

## 2024-01-07 PROCEDURE — 99213 OFFICE O/P EST LOW 20 MIN: CPT | Mod: 25

## 2024-01-07 PROCEDURE — 36415 COLL VENOUS BLD VENIPUNCTURE: CPT

## 2024-01-08 ENCOUNTER — APPOINTMENT (OUTPATIENT)
Dept: HUMAN REPRODUCTION | Facility: CLINIC | Age: 34
End: 2024-01-08
Payer: COMMERCIAL

## 2024-01-08 PROCEDURE — 83002 ASSAY OF GONADOTROPIN (LH): CPT | Mod: QW

## 2024-01-08 PROCEDURE — 84144 ASSAY OF PROGESTERONE: CPT

## 2024-01-08 PROCEDURE — 82670 ASSAY OF TOTAL ESTRADIOL: CPT

## 2024-01-08 PROCEDURE — 36415 COLL VENOUS BLD VENIPUNCTURE: CPT

## 2024-01-08 PROCEDURE — 76857 US EXAM PELVIC LIMITED: CPT

## 2024-01-08 PROCEDURE — 99213 OFFICE O/P EST LOW 20 MIN: CPT | Mod: 25

## 2024-01-09 ENCOUNTER — APPOINTMENT (OUTPATIENT)
Dept: HUMAN REPRODUCTION | Facility: CLINIC | Age: 34
End: 2024-01-09
Payer: COMMERCIAL

## 2024-01-09 PROCEDURE — 83002 ASSAY OF GONADOTROPIN (LH): CPT | Mod: QW

## 2024-01-09 PROCEDURE — 84144 ASSAY OF PROGESTERONE: CPT

## 2024-01-09 PROCEDURE — 36415 COLL VENOUS BLD VENIPUNCTURE: CPT

## 2024-01-09 PROCEDURE — 99213 OFFICE O/P EST LOW 20 MIN: CPT | Mod: 25

## 2024-01-09 PROCEDURE — 76857 US EXAM PELVIC LIMITED: CPT

## 2024-01-09 PROCEDURE — 82670 ASSAY OF TOTAL ESTRADIOL: CPT

## 2024-01-10 ENCOUNTER — APPOINTMENT (OUTPATIENT)
Dept: HUMAN REPRODUCTION | Facility: CLINIC | Age: 34
End: 2024-01-10
Payer: COMMERCIAL

## 2024-01-10 PROCEDURE — 76857 US EXAM PELVIC LIMITED: CPT

## 2024-01-10 PROCEDURE — 84144 ASSAY OF PROGESTERONE: CPT

## 2024-01-10 PROCEDURE — 99213 OFFICE O/P EST LOW 20 MIN: CPT | Mod: 25

## 2024-01-10 PROCEDURE — 36415 COLL VENOUS BLD VENIPUNCTURE: CPT

## 2024-01-10 PROCEDURE — 83002 ASSAY OF GONADOTROPIN (LH): CPT | Mod: QW

## 2024-01-10 PROCEDURE — 82670 ASSAY OF TOTAL ESTRADIOL: CPT

## 2024-01-11 ENCOUNTER — APPOINTMENT (OUTPATIENT)
Dept: HUMAN REPRODUCTION | Facility: CLINIC | Age: 34
End: 2024-01-11

## 2024-01-11 ENCOUNTER — APPOINTMENT (OUTPATIENT)
Dept: HUMAN REPRODUCTION | Facility: CLINIC | Age: 34
End: 2024-01-11
Payer: COMMERCIAL

## 2024-01-11 PROCEDURE — 36415 COLL VENOUS BLD VENIPUNCTURE: CPT

## 2024-01-11 PROCEDURE — 99213 OFFICE O/P EST LOW 20 MIN: CPT | Mod: 25

## 2024-01-11 PROCEDURE — 76857 US EXAM PELVIC LIMITED: CPT

## 2024-01-12 ENCOUNTER — APPOINTMENT (OUTPATIENT)
Dept: HUMAN REPRODUCTION | Facility: CLINIC | Age: 34
End: 2024-01-12
Payer: COMMERCIAL

## 2024-01-12 PROCEDURE — 99213 OFFICE O/P EST LOW 20 MIN: CPT | Mod: 25

## 2024-01-12 PROCEDURE — 76857 US EXAM PELVIC LIMITED: CPT

## 2024-01-12 PROCEDURE — 36415 COLL VENOUS BLD VENIPUNCTURE: CPT

## 2024-01-12 PROCEDURE — 84144 ASSAY OF PROGESTERONE: CPT

## 2024-01-12 PROCEDURE — 82670 ASSAY OF TOTAL ESTRADIOL: CPT

## 2024-01-12 PROCEDURE — 83002 ASSAY OF GONADOTROPIN (LH): CPT | Mod: QW

## 2024-01-14 ENCOUNTER — APPOINTMENT (OUTPATIENT)
Dept: HUMAN REPRODUCTION | Facility: CLINIC | Age: 34
End: 2024-01-14
Payer: COMMERCIAL

## 2024-01-14 PROCEDURE — 99213 OFFICE O/P EST LOW 20 MIN: CPT | Mod: 25

## 2024-01-14 PROCEDURE — 76857 US EXAM PELVIC LIMITED: CPT

## 2024-01-14 PROCEDURE — 36415 COLL VENOUS BLD VENIPUNCTURE: CPT

## 2024-01-16 ENCOUNTER — APPOINTMENT (OUTPATIENT)
Dept: HUMAN REPRODUCTION | Facility: CLINIC | Age: 34
End: 2024-01-16
Payer: COMMERCIAL

## 2024-01-16 PROCEDURE — 76948 ECHO GUIDE OVA ASPIRATION: CPT

## 2024-01-16 PROCEDURE — 89337 CRYOPRESERVATION OOCYTE(S): CPT

## 2024-01-16 PROCEDURE — 89254 OOCYTE IDENTIFICATION: CPT

## 2024-01-16 PROCEDURE — 89346 STORAGE/YEAR OOCYTE(S): CPT

## 2024-01-16 PROCEDURE — 58970 RETRIEVAL OF OOCYTE: CPT

## 2024-01-18 ENCOUNTER — APPOINTMENT (OUTPATIENT)
Dept: HUMAN REPRODUCTION | Facility: CLINIC | Age: 34
End: 2024-01-18
Payer: COMMERCIAL

## 2024-01-18 PROCEDURE — ZZZZZ: CPT

## 2024-01-18 PROCEDURE — 76857 US EXAM PELVIC LIMITED: CPT

## 2024-01-18 PROCEDURE — 36415 COLL VENOUS BLD VENIPUNCTURE: CPT

## 2024-01-18 PROCEDURE — 99213 OFFICE O/P EST LOW 20 MIN: CPT | Mod: 25

## 2024-05-13 ENCOUNTER — APPOINTMENT (OUTPATIENT)
Dept: HUMAN REPRODUCTION | Facility: CLINIC | Age: 34
End: 2024-05-13
Payer: COMMERCIAL

## 2024-05-13 PROCEDURE — 99214 OFFICE O/P EST MOD 30 MIN: CPT

## 2024-06-22 ENCOUNTER — NON-APPOINTMENT (OUTPATIENT)
Age: 34
End: 2024-06-22

## 2024-08-13 ENCOUNTER — APPOINTMENT (OUTPATIENT)
Dept: HUMAN REPRODUCTION | Facility: CLINIC | Age: 34
End: 2024-08-13
Payer: COMMERCIAL

## 2024-08-13 PROCEDURE — 99214 OFFICE O/P EST MOD 30 MIN: CPT

## 2024-10-15 ENCOUNTER — APPOINTMENT (OUTPATIENT)
Dept: HUMAN REPRODUCTION | Facility: CLINIC | Age: 34
End: 2024-10-15
Payer: COMMERCIAL

## 2024-10-15 PROCEDURE — 99214 OFFICE O/P EST MOD 30 MIN: CPT

## 2025-01-15 ENCOUNTER — APPOINTMENT (OUTPATIENT)
Dept: HUMAN REPRODUCTION | Facility: CLINIC | Age: 35
End: 2025-01-15

## 2025-01-22 ENCOUNTER — APPOINTMENT (OUTPATIENT)
Dept: HUMAN REPRODUCTION | Facility: CLINIC | Age: 35
End: 2025-01-22

## 2025-01-22 PROCEDURE — 99214 OFFICE O/P EST MOD 30 MIN: CPT | Mod: 95

## 2025-03-20 ENCOUNTER — APPOINTMENT (OUTPATIENT)
Dept: HUMAN REPRODUCTION | Facility: CLINIC | Age: 35
End: 2025-03-20
Payer: COMMERCIAL

## 2025-03-20 PROCEDURE — 99214 OFFICE O/P EST MOD 30 MIN: CPT | Mod: 95

## 2025-04-30 ENCOUNTER — NON-APPOINTMENT (OUTPATIENT)
Age: 35
End: 2025-04-30

## 2025-04-30 ENCOUNTER — APPOINTMENT (OUTPATIENT)
Dept: INTERNAL MEDICINE | Facility: CLINIC | Age: 35
End: 2025-04-30
Payer: COMMERCIAL

## 2025-04-30 VITALS
OXYGEN SATURATION: 100 % | WEIGHT: 123.25 LBS | DIASTOLIC BLOOD PRESSURE: 80 MMHG | TEMPERATURE: 98 F | HEART RATE: 88 BPM | BODY MASS INDEX: 22.68 KG/M2 | SYSTOLIC BLOOD PRESSURE: 116 MMHG | HEIGHT: 62 IN

## 2025-04-30 DIAGNOSIS — F90.9 ATTENTION-DEFICIT HYPERACTIVITY DISORDER, UNSPECIFIED TYPE: ICD-10-CM

## 2025-04-30 DIAGNOSIS — Z23 ENCOUNTER FOR IMMUNIZATION: ICD-10-CM

## 2025-04-30 DIAGNOSIS — H90.3 SENSORINEURAL HEARING LOSS, BILATERAL: ICD-10-CM

## 2025-04-30 DIAGNOSIS — Z97.4 PRESENCE OF EXTERNAL HEARING-AID: ICD-10-CM

## 2025-04-30 DIAGNOSIS — E03.9 HYPOTHYROIDISM, UNSPECIFIED: ICD-10-CM

## 2025-04-30 DIAGNOSIS — Z87.59 PERSONAL HISTORY OF OTHER COMPLICATIONS OF PREGNANCY, CHILDBIRTH AND THE PUERPERIUM: ICD-10-CM

## 2025-04-30 DIAGNOSIS — Z82.69 FAMILY HISTORY OF OTHER DISEASES OF THE MUSCULOSKELETAL SYSTEM AND CONNECTIVE TISSUE: ICD-10-CM

## 2025-04-30 DIAGNOSIS — E04.1 NONTOXIC SINGLE THYROID NODULE: ICD-10-CM

## 2025-04-30 PROCEDURE — 90471 IMMUNIZATION ADMIN: CPT

## 2025-04-30 PROCEDURE — 36415 COLL VENOUS BLD VENIPUNCTURE: CPT

## 2025-04-30 PROCEDURE — 90651 9VHPV VACCINE 2/3 DOSE IM: CPT

## 2025-04-30 PROCEDURE — 93000 ELECTROCARDIOGRAM COMPLETE: CPT

## 2025-04-30 PROCEDURE — 99385 PREV VISIT NEW AGE 18-39: CPT | Mod: 25

## 2025-04-30 PROCEDURE — G0537: CPT

## 2025-04-30 RX ORDER — DEXTROAMPHETAMINE SACCHARATE, AMPHETAMINE ASPARTATE, DEXTROAMPHETAMINE SULFATE AND AMPHETAMINE SULFATE 2.5; 2.5; 2.5; 2.5 MG/1; MG/1; MG/1; MG/1
10 TABLET ORAL 3 TIMES DAILY
Qty: 90 | Refills: 0 | Status: ACTIVE | COMMUNITY
Start: 2025-04-30 | End: 1900-01-01

## 2025-05-01 ENCOUNTER — TRANSCRIPTION ENCOUNTER (OUTPATIENT)
Age: 35
End: 2025-05-01

## 2025-05-01 ENCOUNTER — APPOINTMENT (OUTPATIENT)
Dept: HUMAN REPRODUCTION | Facility: CLINIC | Age: 35
End: 2025-05-01

## 2025-05-01 LAB
ALBUMIN SERPL ELPH-MCNC: 4.8 G/DL
ALP BLD-CCNC: 75 U/L
ALT SERPL-CCNC: 29 U/L
ANION GAP SERPL CALC-SCNC: 16 MMOL/L
AST SERPL-CCNC: 28 U/L
BASOPHILS # BLD AUTO: 0.01 K/UL
BASOPHILS NFR BLD AUTO: 0.2 %
BILIRUB SERPL-MCNC: 0.4 MG/DL
BUN SERPL-MCNC: 11 MG/DL
CALCIUM SERPL-MCNC: 10.1 MG/DL
CHLORIDE SERPL-SCNC: 102 MMOL/L
CHOLEST SERPL-MCNC: 250 MG/DL
CO2 SERPL-SCNC: 22 MMOL/L
CREAT SERPL-MCNC: 0.75 MG/DL
EGFRCR SERPLBLD CKD-EPI 2021: 107 ML/MIN/1.73M2
EOSINOPHIL # BLD AUTO: 0.18 K/UL
EOSINOPHIL NFR BLD AUTO: 2.9 %
ESTIMATED AVERAGE GLUCOSE: 103 MG/DL
GLUCOSE SERPL-MCNC: 89 MG/DL
HBA1C MFR BLD HPLC: 5.2 %
HCT VFR BLD CALC: 41.5 %
HDLC SERPL-MCNC: 91 MG/DL
HGB BLD-MCNC: 13.4 G/DL
IMM GRANULOCYTES NFR BLD AUTO: 0 %
LDLC SERPL-MCNC: 150 MG/DL
LYMPHOCYTES # BLD AUTO: 2.18 K/UL
LYMPHOCYTES NFR BLD AUTO: 34.7 %
MAN DIFF?: NORMAL
MCHC RBC-ENTMCNC: 28.2 PG
MCHC RBC-ENTMCNC: 32.3 G/DL
MCV RBC AUTO: 87.4 FL
MONOCYTES # BLD AUTO: 0.44 K/UL
MONOCYTES NFR BLD AUTO: 7 %
NEUTROPHILS # BLD AUTO: 3.48 K/UL
NEUTROPHILS NFR BLD AUTO: 55.2 %
NONHDLC SERPL-MCNC: 159 MG/DL
PLATELET # BLD AUTO: 313 K/UL
POTASSIUM SERPL-SCNC: 4.9 MMOL/L
PROT SERPL-MCNC: 7.5 G/DL
RBC # BLD: 4.75 M/UL
RBC # FLD: 13.3 %
SODIUM SERPL-SCNC: 140 MMOL/L
TRIGL SERPL-MCNC: 57 MG/DL
TSH SERPL-ACNC: 2.39 UIU/ML
WBC # FLD AUTO: 6.29 K/UL

## 2025-05-05 ENCOUNTER — TRANSCRIPTION ENCOUNTER (OUTPATIENT)
Age: 35
End: 2025-05-05

## 2025-05-06 ENCOUNTER — OUTPATIENT (OUTPATIENT)
Dept: OUTPATIENT SERVICES | Facility: HOSPITAL | Age: 35
LOS: 1 days | End: 2025-05-06

## 2025-05-06 DIAGNOSIS — Z87.59 PERSONAL HISTORY OF OTHER COMPLICATIONS OF PREGNANCY, CHILDBIRTH AND THE PUERPERIUM: Chronic | ICD-10-CM

## 2025-05-06 DIAGNOSIS — Z98.890 OTHER SPECIFIED POSTPROCEDURAL STATES: Chronic | ICD-10-CM

## 2025-05-06 DIAGNOSIS — N97.9 FEMALE INFERTILITY, UNSPECIFIED: ICD-10-CM

## 2025-05-06 DIAGNOSIS — K38.9 DISEASE OF APPENDIX, UNSPECIFIED: Chronic | ICD-10-CM

## 2025-05-16 ENCOUNTER — APPOINTMENT (OUTPATIENT)
Dept: HUMAN REPRODUCTION | Facility: CLINIC | Age: 35
End: 2025-05-16

## 2025-05-16 PROCEDURE — 99213 OFFICE O/P EST LOW 20 MIN: CPT | Mod: 25

## 2025-05-16 PROCEDURE — 36415 COLL VENOUS BLD VENIPUNCTURE: CPT

## 2025-05-16 PROCEDURE — 84702 CHORIONIC GONADOTROPIN TEST: CPT

## 2025-05-16 PROCEDURE — 76857 US EXAM PELVIC LIMITED: CPT

## 2025-05-16 PROCEDURE — 83002 ASSAY OF GONADOTROPIN (LH): CPT | Mod: QW

## 2025-05-16 PROCEDURE — 82670 ASSAY OF TOTAL ESTRADIOL: CPT

## 2025-05-16 PROCEDURE — 84144 ASSAY OF PROGESTERONE: CPT

## 2025-05-19 ENCOUNTER — APPOINTMENT (OUTPATIENT)
Dept: HUMAN REPRODUCTION | Facility: CLINIC | Age: 35
End: 2025-05-19

## 2025-05-19 PROCEDURE — 84144 ASSAY OF PROGESTERONE: CPT

## 2025-05-19 PROCEDURE — 82670 ASSAY OF TOTAL ESTRADIOL: CPT

## 2025-05-19 PROCEDURE — 83002 ASSAY OF GONADOTROPIN (LH): CPT | Mod: QW

## 2025-05-19 PROCEDURE — 99213 OFFICE O/P EST LOW 20 MIN: CPT | Mod: 25

## 2025-05-19 PROCEDURE — 36415 COLL VENOUS BLD VENIPUNCTURE: CPT

## 2025-05-19 PROCEDURE — 76857 US EXAM PELVIC LIMITED: CPT

## 2025-05-19 PROCEDURE — 84702 CHORIONIC GONADOTROPIN TEST: CPT

## 2025-05-21 ENCOUNTER — APPOINTMENT (OUTPATIENT)
Dept: HUMAN REPRODUCTION | Facility: CLINIC | Age: 35
End: 2025-05-21

## 2025-05-21 PROCEDURE — 84144 ASSAY OF PROGESTERONE: CPT

## 2025-05-21 PROCEDURE — 99213 OFFICE O/P EST LOW 20 MIN: CPT | Mod: 25

## 2025-05-21 PROCEDURE — 83002 ASSAY OF GONADOTROPIN (LH): CPT | Mod: QW

## 2025-05-21 PROCEDURE — 36415 COLL VENOUS BLD VENIPUNCTURE: CPT

## 2025-05-21 PROCEDURE — 76857 US EXAM PELVIC LIMITED: CPT

## 2025-05-21 PROCEDURE — 82670 ASSAY OF TOTAL ESTRADIOL: CPT

## 2025-05-23 ENCOUNTER — APPOINTMENT (OUTPATIENT)
Dept: HUMAN REPRODUCTION | Facility: CLINIC | Age: 35
End: 2025-05-23

## 2025-05-23 PROCEDURE — 82670 ASSAY OF TOTAL ESTRADIOL: CPT

## 2025-05-23 PROCEDURE — 36415 COLL VENOUS BLD VENIPUNCTURE: CPT

## 2025-05-23 PROCEDURE — 84144 ASSAY OF PROGESTERONE: CPT

## 2025-05-23 PROCEDURE — 76857 US EXAM PELVIC LIMITED: CPT

## 2025-05-23 PROCEDURE — 83002 ASSAY OF GONADOTROPIN (LH): CPT | Mod: QW

## 2025-05-23 PROCEDURE — 99213 OFFICE O/P EST LOW 20 MIN: CPT | Mod: 25

## 2025-05-26 ENCOUNTER — APPOINTMENT (OUTPATIENT)
Dept: HUMAN REPRODUCTION | Facility: CLINIC | Age: 35
End: 2025-05-26

## 2025-05-26 PROCEDURE — 36415 COLL VENOUS BLD VENIPUNCTURE: CPT

## 2025-05-27 ENCOUNTER — APPOINTMENT (OUTPATIENT)
Dept: HUMAN REPRODUCTION | Facility: CLINIC | Age: 35
End: 2025-05-27

## 2025-05-27 PROCEDURE — 83002 ASSAY OF GONADOTROPIN (LH): CPT | Mod: QW

## 2025-05-27 PROCEDURE — 99213 OFFICE O/P EST LOW 20 MIN: CPT | Mod: 25

## 2025-05-27 PROCEDURE — 82670 ASSAY OF TOTAL ESTRADIOL: CPT

## 2025-05-27 PROCEDURE — 84144 ASSAY OF PROGESTERONE: CPT

## 2025-05-27 PROCEDURE — 36415 COLL VENOUS BLD VENIPUNCTURE: CPT

## 2025-05-27 PROCEDURE — 76857 US EXAM PELVIC LIMITED: CPT

## 2025-05-28 ENCOUNTER — APPOINTMENT (OUTPATIENT)
Dept: HUMAN REPRODUCTION | Facility: CLINIC | Age: 35
End: 2025-05-28

## 2025-05-28 PROCEDURE — 99213 OFFICE O/P EST LOW 20 MIN: CPT | Mod: 25

## 2025-05-28 PROCEDURE — 84144 ASSAY OF PROGESTERONE: CPT

## 2025-05-28 PROCEDURE — 36415 COLL VENOUS BLD VENIPUNCTURE: CPT

## 2025-05-28 PROCEDURE — 76857 US EXAM PELVIC LIMITED: CPT

## 2025-05-28 PROCEDURE — 82670 ASSAY OF TOTAL ESTRADIOL: CPT

## 2025-05-28 PROCEDURE — 83002 ASSAY OF GONADOTROPIN (LH): CPT | Mod: QW

## 2025-05-29 ENCOUNTER — APPOINTMENT (OUTPATIENT)
Dept: HUMAN REPRODUCTION | Facility: CLINIC | Age: 35
End: 2025-05-29

## 2025-05-29 PROCEDURE — 99213 OFFICE O/P EST LOW 20 MIN: CPT | Mod: 25

## 2025-05-29 PROCEDURE — 83002 ASSAY OF GONADOTROPIN (LH): CPT | Mod: QW

## 2025-05-29 PROCEDURE — 84144 ASSAY OF PROGESTERONE: CPT

## 2025-05-29 PROCEDURE — 76857 US EXAM PELVIC LIMITED: CPT

## 2025-05-29 PROCEDURE — 36415 COLL VENOUS BLD VENIPUNCTURE: CPT

## 2025-05-29 PROCEDURE — 82670 ASSAY OF TOTAL ESTRADIOL: CPT

## 2025-05-30 ENCOUNTER — APPOINTMENT (OUTPATIENT)
Dept: HUMAN REPRODUCTION | Facility: CLINIC | Age: 35
End: 2025-05-30

## 2025-05-30 PROCEDURE — 99213 OFFICE O/P EST LOW 20 MIN: CPT | Mod: 25

## 2025-05-30 PROCEDURE — 82670 ASSAY OF TOTAL ESTRADIOL: CPT

## 2025-05-30 PROCEDURE — 76857 US EXAM PELVIC LIMITED: CPT

## 2025-05-30 PROCEDURE — 83002 ASSAY OF GONADOTROPIN (LH): CPT | Mod: QW

## 2025-05-30 PROCEDURE — 84144 ASSAY OF PROGESTERONE: CPT

## 2025-05-30 PROCEDURE — 36415 COLL VENOUS BLD VENIPUNCTURE: CPT

## 2025-06-01 PROCEDURE — 89353 THAWING CRYOPRESRVED SPERM: CPT

## 2025-06-01 PROCEDURE — 89261 SPERM ISOLATION COMPLEX: CPT

## 2025-06-01 PROCEDURE — 89281 ASSIST OOCYTE FERTILIZATION: CPT

## 2025-06-01 PROCEDURE — 89254 OOCYTE IDENTIFICATION: CPT

## 2025-06-01 PROCEDURE — 89250 CULTR OOCYTE/EMBRYO <4 DAYS: CPT

## 2025-06-02 ENCOUNTER — APPOINTMENT (OUTPATIENT)
Dept: HUMAN REPRODUCTION | Facility: CLINIC | Age: 35
End: 2025-06-02

## 2025-06-02 PROCEDURE — ZZZZZ: CPT

## 2025-06-03 ENCOUNTER — TRANSCRIPTION ENCOUNTER (OUTPATIENT)
Age: 35
End: 2025-06-03

## 2025-06-05 PROCEDURE — 89253 EMBRYO HATCHING: CPT

## 2025-06-06 PROCEDURE — 89258 CRYOPRESERVATION EMBRYO(S): CPT

## 2025-06-06 PROCEDURE — 89290 BIOPSY OOCYTE POLAR BODY <=5: CPT

## 2025-06-06 PROCEDURE — 89272 EXTENDED CULTURE OF OOCYTES: CPT

## 2025-06-06 PROCEDURE — 89342 STORAGE/YEAR EMBRYO(S): CPT | Mod: NC

## 2025-06-07 PROCEDURE — 89258 CRYOPRESERVATION EMBRYO(S): CPT

## 2025-06-07 PROCEDURE — 89291 BIOPSY OOCYTE POLAR BODY: CPT

## 2025-06-11 ENCOUNTER — TRANSCRIPTION ENCOUNTER (OUTPATIENT)
Age: 35
End: 2025-06-11

## 2025-06-11 ENCOUNTER — RESULT REVIEW (OUTPATIENT)
Age: 35
End: 2025-06-11

## 2025-06-11 ENCOUNTER — OUTPATIENT (OUTPATIENT)
Dept: OUTPATIENT SERVICES | Facility: HOSPITAL | Age: 35
LOS: 1 days | End: 2025-06-11
Payer: COMMERCIAL

## 2025-06-11 DIAGNOSIS — Z87.59 PERSONAL HISTORY OF OTHER COMPLICATIONS OF PREGNANCY, CHILDBIRTH AND THE PUERPERIUM: Chronic | ICD-10-CM

## 2025-06-11 DIAGNOSIS — E04.1 NONTOXIC SINGLE THYROID NODULE: ICD-10-CM

## 2025-06-11 DIAGNOSIS — Z98.890 OTHER SPECIFIED POSTPROCEDURAL STATES: Chronic | ICD-10-CM

## 2025-06-11 DIAGNOSIS — K38.9 DISEASE OF APPENDIX, UNSPECIFIED: Chronic | ICD-10-CM

## 2025-06-11 DIAGNOSIS — Z00.8 ENCOUNTER FOR OTHER GENERAL EXAMINATION: ICD-10-CM

## 2025-06-11 PROCEDURE — 76536 US EXAM OF HEAD AND NECK: CPT | Mod: 26

## 2025-06-11 PROCEDURE — 76536 US EXAM OF HEAD AND NECK: CPT

## 2025-06-12 ENCOUNTER — NON-APPOINTMENT (OUTPATIENT)
Age: 35
End: 2025-06-12

## 2025-06-12 ENCOUNTER — TRANSCRIPTION ENCOUNTER (OUTPATIENT)
Age: 35
End: 2025-06-12

## 2025-06-12 DIAGNOSIS — E04.1 NONTOXIC SINGLE THYROID NODULE: ICD-10-CM

## 2025-06-18 ENCOUNTER — APPOINTMENT (OUTPATIENT)
Dept: OBGYN | Facility: CLINIC | Age: 35
End: 2025-06-18

## 2025-06-18 VITALS
DIASTOLIC BLOOD PRESSURE: 70 MMHG | WEIGHT: 126 LBS | HEIGHT: 62 IN | SYSTOLIC BLOOD PRESSURE: 112 MMHG | BODY MASS INDEX: 23.19 KG/M2

## 2025-06-18 PROBLEM — Z32.00 UNCONFIRMED PREGNANCY: Status: RESOLVED | Noted: 2021-05-26 | Resolved: 2025-06-18

## 2025-06-18 PROBLEM — M65.931 EXTENSOR TENOSYNOVITIS OF RIGHT WRIST: Status: RESOLVED | Noted: 2023-01-31 | Resolved: 2025-06-18

## 2025-06-18 PROBLEM — Z01.419 WOMEN'S ANNUAL ROUTINE GYNECOLOGICAL EXAMINATION: Status: RESOLVED | Noted: 2020-08-17 | Resolved: 2025-06-18

## 2025-06-18 PROBLEM — K35.80 ACUTE APPENDICITIS: Status: RESOLVED | Noted: 2019-12-13 | Resolved: 2025-06-18

## 2025-06-18 PROCEDURE — 99204 OFFICE O/P NEW MOD 45 MIN: CPT

## 2025-07-02 ENCOUNTER — APPOINTMENT (OUTPATIENT)
Dept: HUMAN REPRODUCTION | Facility: CLINIC | Age: 35
End: 2025-07-02
Payer: COMMERCIAL

## 2025-07-02 PROCEDURE — 99214 OFFICE O/P EST MOD 30 MIN: CPT | Mod: 95

## 2025-07-09 ENCOUNTER — APPOINTMENT (OUTPATIENT)
Dept: HUMAN REPRODUCTION | Facility: CLINIC | Age: 35
End: 2025-07-09
Payer: COMMERCIAL

## 2025-07-09 ENCOUNTER — APPOINTMENT (OUTPATIENT)
Dept: INTERNAL MEDICINE | Facility: CLINIC | Age: 35
End: 2025-07-09

## 2025-07-09 PROBLEM — Z23 ENCOUNTER FOR IMMUNIZATION: Status: ACTIVE | Noted: 2025-04-30 | Resolved: 2025-07-23

## 2025-07-09 PROCEDURE — 99213 OFFICE O/P EST LOW 20 MIN: CPT | Mod: 95

## 2025-07-17 ENCOUNTER — APPOINTMENT (OUTPATIENT)
Dept: HUMAN REPRODUCTION | Facility: CLINIC | Age: 35
End: 2025-07-17
Payer: COMMERCIAL

## 2025-07-17 PROCEDURE — 76831 ECHO EXAM UTERUS: CPT

## 2025-07-17 PROCEDURE — 58999I: CUSTOM

## 2025-07-17 PROCEDURE — 58340 CATHETER FOR HYSTEROGRAPHY: CPT

## 2025-07-21 ENCOUNTER — APPOINTMENT (OUTPATIENT)
Dept: HUMAN REPRODUCTION | Facility: CLINIC | Age: 35
End: 2025-07-21
Payer: COMMERCIAL

## 2025-07-21 PROCEDURE — 76857 US EXAM PELVIC LIMITED: CPT

## 2025-07-21 PROCEDURE — 84144 ASSAY OF PROGESTERONE: CPT

## 2025-07-21 PROCEDURE — 36415 COLL VENOUS BLD VENIPUNCTURE: CPT

## 2025-07-21 PROCEDURE — 99213 OFFICE O/P EST LOW 20 MIN: CPT | Mod: 25

## 2025-07-21 PROCEDURE — 82670 ASSAY OF TOTAL ESTRADIOL: CPT

## 2025-07-21 PROCEDURE — 83002 ASSAY OF GONADOTROPIN (LH): CPT | Mod: QW

## 2025-07-22 ENCOUNTER — APPOINTMENT (OUTPATIENT)
Dept: HUMAN REPRODUCTION | Facility: CLINIC | Age: 35
End: 2025-07-22
Payer: COMMERCIAL

## 2025-07-22 PROCEDURE — 83002 ASSAY OF GONADOTROPIN (LH): CPT | Mod: QW

## 2025-07-22 PROCEDURE — 36415 COLL VENOUS BLD VENIPUNCTURE: CPT

## 2025-07-22 PROCEDURE — 84144 ASSAY OF PROGESTERONE: CPT

## 2025-07-22 PROCEDURE — 82670 ASSAY OF TOTAL ESTRADIOL: CPT

## 2025-07-23 ENCOUNTER — APPOINTMENT (OUTPATIENT)
Dept: INTERNAL MEDICINE | Facility: CLINIC | Age: 35
End: 2025-07-23

## 2025-07-29 ENCOUNTER — APPOINTMENT (OUTPATIENT)
Dept: HUMAN REPRODUCTION | Facility: CLINIC | Age: 35
End: 2025-07-29
Payer: COMMERCIAL

## 2025-07-29 PROCEDURE — 89352 THAWING CRYOPRESRVED EMBRYO: CPT

## 2025-07-29 PROCEDURE — 76998 US GUIDE INTRAOP: CPT

## 2025-07-29 PROCEDURE — 58974 EMBRYO TRANSFER INTRAUTERINE: CPT

## 2025-07-29 PROCEDURE — 89398A: CUSTOM

## 2025-07-29 PROCEDURE — 89255 PREPARE EMBRYO FOR TRANSFER: CPT

## 2025-07-30 ENCOUNTER — APPOINTMENT (OUTPATIENT)
Dept: HUMAN REPRODUCTION | Facility: CLINIC | Age: 35
End: 2025-07-30

## 2025-08-04 ENCOUNTER — APPOINTMENT (OUTPATIENT)
Dept: INTERNAL MEDICINE | Facility: CLINIC | Age: 35
End: 2025-08-04
Payer: COMMERCIAL

## 2025-08-04 DIAGNOSIS — F90.9 ATTENTION-DEFICIT HYPERACTIVITY DISORDER, UNSPECIFIED TYPE: ICD-10-CM

## 2025-08-04 PROCEDURE — 99214 OFFICE O/P EST MOD 30 MIN: CPT | Mod: 95

## 2025-08-04 PROCEDURE — G2211 COMPLEX E/M VISIT ADD ON: CPT | Mod: 95

## 2025-08-08 ENCOUNTER — APPOINTMENT (OUTPATIENT)
Dept: HUMAN REPRODUCTION | Facility: CLINIC | Age: 35
End: 2025-08-08
Payer: COMMERCIAL

## 2025-08-08 ENCOUNTER — APPOINTMENT (OUTPATIENT)
Dept: HUMAN REPRODUCTION | Facility: CLINIC | Age: 35
End: 2025-08-08

## 2025-08-08 PROCEDURE — 84702 CHORIONIC GONADOTROPIN TEST: CPT

## 2025-08-08 PROCEDURE — 36415 COLL VENOUS BLD VENIPUNCTURE: CPT

## 2025-08-08 PROCEDURE — 84144 ASSAY OF PROGESTERONE: CPT

## 2025-08-11 ENCOUNTER — APPOINTMENT (OUTPATIENT)
Dept: HUMAN REPRODUCTION | Facility: CLINIC | Age: 35
End: 2025-08-11

## 2025-08-12 ENCOUNTER — APPOINTMENT (OUTPATIENT)
Dept: HUMAN REPRODUCTION | Facility: CLINIC | Age: 35
End: 2025-08-12
Payer: COMMERCIAL

## 2025-08-12 ENCOUNTER — APPOINTMENT (OUTPATIENT)
Dept: HUMAN REPRODUCTION | Facility: CLINIC | Age: 35
End: 2025-08-12

## 2025-08-12 PROCEDURE — 84144 ASSAY OF PROGESTERONE: CPT

## 2025-08-12 PROCEDURE — 36415 COLL VENOUS BLD VENIPUNCTURE: CPT

## 2025-08-12 PROCEDURE — 84702 CHORIONIC GONADOTROPIN TEST: CPT

## 2025-08-15 ENCOUNTER — APPOINTMENT (OUTPATIENT)
Dept: HUMAN REPRODUCTION | Facility: CLINIC | Age: 35
End: 2025-08-15

## 2025-08-15 PROCEDURE — 76817 TRANSVAGINAL US OBSTETRIC: CPT

## 2025-08-15 PROCEDURE — 99213 OFFICE O/P EST LOW 20 MIN: CPT | Mod: 25

## 2025-08-15 PROCEDURE — 36415 COLL VENOUS BLD VENIPUNCTURE: CPT

## 2025-08-20 ENCOUNTER — APPOINTMENT (OUTPATIENT)
Dept: CARDIOLOGY | Facility: CLINIC | Age: 35
End: 2025-08-20
Payer: COMMERCIAL

## 2025-08-20 DIAGNOSIS — Z13.6 ENCOUNTER FOR SCREENING FOR CARDIOVASCULAR DISORDERS: ICD-10-CM

## 2025-08-20 DIAGNOSIS — O14.12 SEVERE PRE-ECLAMPSIA, SECOND TRIMESTER: ICD-10-CM

## 2025-08-20 PROCEDURE — 99204 OFFICE O/P NEW MOD 45 MIN: CPT

## 2025-08-20 PROCEDURE — 93000 ELECTROCARDIOGRAM COMPLETE: CPT

## 2025-08-20 RX ORDER — ALBUTEROL SULFATE 90 UG/1
108 (90 BASE) INHALANT RESPIRATORY (INHALATION)
Refills: 0 | Status: ACTIVE | COMMUNITY

## 2025-08-20 RX ORDER — LORATADINE 10 MG/1
10 CAPSULE, LIQUID FILLED ORAL
Refills: 0 | Status: ACTIVE | COMMUNITY

## 2025-08-26 ENCOUNTER — APPOINTMENT (OUTPATIENT)
Dept: HUMAN REPRODUCTION | Facility: CLINIC | Age: 35
End: 2025-08-26

## 2025-08-28 ENCOUNTER — APPOINTMENT (OUTPATIENT)
Dept: CARDIOLOGY | Facility: CLINIC | Age: 35
End: 2025-08-28
Payer: COMMERCIAL

## 2025-08-28 PROCEDURE — 93306 TTE W/DOPPLER COMPLETE: CPT

## 2025-09-15 ENCOUNTER — TRANSCRIPTION ENCOUNTER (OUTPATIENT)
Age: 35
End: 2025-09-15

## 2025-09-17 ENCOUNTER — APPOINTMENT (OUTPATIENT)
Dept: OBGYN | Facility: CLINIC | Age: 35
End: 2025-09-17
Payer: COMMERCIAL

## 2025-09-17 VITALS
HEIGHT: 62 IN | SYSTOLIC BLOOD PRESSURE: 119 MMHG | DIASTOLIC BLOOD PRESSURE: 79 MMHG | WEIGHT: 128 LBS | BODY MASS INDEX: 23.55 KG/M2

## 2025-09-17 DIAGNOSIS — K57.32 DIVERTICULITIS OF LARGE INTESTINE W/OUT PERFORATION OR ABSCESS W/OUT BLEEDING: ICD-10-CM

## 2025-09-17 DIAGNOSIS — H47.323 DRUSEN OF OPTIC DISC, BILATERAL: ICD-10-CM

## 2025-09-17 DIAGNOSIS — Z32.00 ENCOUNTER FOR PREGNANCY TEST, RESULT UNKNOWN: ICD-10-CM

## 2025-09-17 DIAGNOSIS — R22.31 LOCALIZED SWELLING, MASS AND LUMP, RIGHT UPPER LIMB: ICD-10-CM

## 2025-09-17 DIAGNOSIS — K29.80 DUODENITIS W/OUT BLEEDING: ICD-10-CM

## 2025-09-17 DIAGNOSIS — O09.819 SUPERVISION OF PREGNANCY RESULTING FROM ASSISTED REPRODUCTIVE TECHNOLOGY, UNSPECIFIED TRIMESTER: ICD-10-CM

## 2025-09-17 PROCEDURE — 36415 COLL VENOUS BLD VENIPUNCTURE: CPT

## 2025-09-17 PROCEDURE — 99214 OFFICE O/P EST MOD 30 MIN: CPT | Mod: 25

## 2025-09-17 PROCEDURE — 76817 TRANSVAGINAL US OBSTETRIC: CPT

## 2025-09-19 LAB
ABORH: NORMAL
ALBUMIN SERPL ELPH-MCNC: 4.6 G/DL
ALP BLD-CCNC: 62 U/L
ALT SERPL-CCNC: 13 U/L
ANION GAP SERPL CALC-SCNC: 19 MMOL/L
ANTIBODY SCREEN: NORMAL
AST SERPL-CCNC: 21 U/L
BACTERIA UR CULT: NORMAL
BASOPHILS # BLD AUTO: 0.02 K/UL
BASOPHILS NFR BLD AUTO: 0.2 %
BILIRUB SERPL-MCNC: 0.4 MG/DL
BUN SERPL-MCNC: 12 MG/DL
C TRACH RRNA SPEC QL NAA+PROBE: NOT DETECTED
CALCIUM SERPL-MCNC: 9.9 MG/DL
CHLORIDE SERPL-SCNC: 103 MMOL/L
CO2 SERPL-SCNC: 14 MMOL/L
CREAT SERPL-MCNC: 0.6 MG/DL
CREAT SPEC-SCNC: 78 MG/DL
CREAT/PROT UR: 0.1 RATIO
EGFRCR SERPLBLD CKD-EPI 2021: 120 ML/MIN/1.73M2
EOSINOPHIL # BLD AUTO: 0.09 K/UL
EOSINOPHIL NFR BLD AUTO: 1 %
GLUCOSE SERPL-MCNC: 42 MG/DL
HBV SURFACE AG SERPL QL IA: NONREACTIVE
HCT VFR BLD CALC: 40.7 %
HCV RNA SERPL NAA+PROBE-LOG IU: NOT DETECTED LOGIU/ML
HEPC RNA INTERP: NOT DETECTED IU/ML
HGB BLD-MCNC: 13 G/DL
HIV1+2 AB SPEC QL IA.RAPID: NONREACTIVE
IMM GRANULOCYTES NFR BLD AUTO: 0.1 %
LEAD BLD-MCNC: <1 UG/DL
LYMPHOCYTES # BLD AUTO: 2.36 K/UL
LYMPHOCYTES NFR BLD AUTO: 26.7 %
MAN DIFF?: NORMAL
MCHC RBC-ENTMCNC: 27.6 PG
MCHC RBC-ENTMCNC: 31.9 G/DL
MCV RBC AUTO: 86.4 FL
MEV IGG FLD QL IA: 48.3 AU/ML
MEV IGG+IGM SER-IMP: POSITIVE
MONOCYTES # BLD AUTO: 0.47 K/UL
MONOCYTES NFR BLD AUTO: 5.3 %
N GONORRHOEA RRNA SPEC QL NAA+PROBE: NOT DETECTED
NEUTROPHILS # BLD AUTO: 5.89 K/UL
NEUTROPHILS NFR BLD AUTO: 66.7 %
PLATELET # BLD AUTO: 276 K/UL
PMV BLD AUTO: 0 /100 WBCS
PMV BLD: 11.5 FL
POTASSIUM SERPL-SCNC: 4.8 MMOL/L
PROT SERPL-MCNC: 7.1 G/DL
PROT UR-MCNC: 6 MG/DL
RBC # BLD: 4.71 M/UL
RBC # FLD: 12.5 %
RUBV IGG FLD-ACNC: 0.64 INDEX
RUBV IGG SER-IMP: NEGATIVE
SODIUM SERPL-SCNC: 136 MMOL/L
SOURCE AMPLIFICATION: NORMAL
T PALLIDUM AB SER QL IA: NEGATIVE
TSH SERPL-ACNC: 1.86 UIU/ML
VZV AB TITR SER: POSITIVE
VZV IGG SER IF-ACNC: 1.12 S/CO
WBC # FLD AUTO: 8.84 K/UL

## 2025-09-22 PROBLEM — R22.31 SUBCUTANEOUS MASS OF RIGHT HAND: Status: RESOLVED | Noted: 2023-01-13 | Resolved: 2025-09-22

## 2025-09-22 PROBLEM — K29.80 DUODENITIS: Status: RESOLVED | Noted: 2020-01-13 | Resolved: 2025-09-22

## 2025-09-22 PROBLEM — K57.32 CECAL DIVERTICULITIS: Status: RESOLVED | Noted: 2019-12-13 | Resolved: 2025-09-22

## 2025-09-22 PROBLEM — O09.819 PREGNANCY RESULTING FROM IN-VITRO FERTILIZATION: Status: ACTIVE | Noted: 2025-09-22

## 2025-09-22 PROBLEM — H47.323 DRUSEN OF BOTH OPTIC DISCS: Status: RESOLVED | Noted: 2018-09-28 | Resolved: 2025-09-22
